# Patient Record
Sex: FEMALE | Race: WHITE | NOT HISPANIC OR LATINO | Employment: FULL TIME | URBAN - METROPOLITAN AREA
[De-identification: names, ages, dates, MRNs, and addresses within clinical notes are randomized per-mention and may not be internally consistent; named-entity substitution may affect disease eponyms.]

---

## 2017-07-17 ENCOUNTER — ALLSCRIPTS OFFICE VISIT (OUTPATIENT)
Dept: OTHER | Facility: OTHER | Age: 42
End: 2017-07-17

## 2017-08-08 ENCOUNTER — LAB REQUISITION (OUTPATIENT)
Dept: LAB | Facility: HOSPITAL | Age: 42
End: 2017-08-08
Payer: COMMERCIAL

## 2017-08-08 ENCOUNTER — ALLSCRIPTS OFFICE VISIT (OUTPATIENT)
Dept: OTHER | Facility: OTHER | Age: 42
End: 2017-08-08

## 2017-08-08 DIAGNOSIS — N39.0 URINARY TRACT INFECTION: ICD-10-CM

## 2017-08-08 DIAGNOSIS — Z12.31 ENCOUNTER FOR SCREENING MAMMOGRAM FOR MALIGNANT NEOPLASM OF BREAST: ICD-10-CM

## 2017-08-08 LAB
BILIRUB UR QL STRIP: NEGATIVE
CLARITY UR: NORMAL
COLOR UR: NORMAL
GLUCOSE (HISTORICAL): NEGATIVE
HGB UR QL STRIP.AUTO: NEGATIVE
KETONES UR STRIP-MCNC: 80 MG/DL
LEUKOCYTE ESTERASE UR QL STRIP: NEGATIVE
NITRITE UR QL STRIP: NEGATIVE
PH UR STRIP.AUTO: 5 [PH]
PROT UR STRIP-MCNC: 15 MG/DL
SP GR UR STRIP.AUTO: 1.03
UROBILINOGEN UR QL STRIP.AUTO: NEGATIVE

## 2017-08-08 PROCEDURE — 87086 URINE CULTURE/COLONY COUNT: CPT | Performed by: NURSE PRACTITIONER

## 2017-08-09 LAB — BACTERIA UR CULT: NORMAL

## 2017-08-10 ENCOUNTER — GENERIC CONVERSION - ENCOUNTER (OUTPATIENT)
Dept: OTHER | Facility: OTHER | Age: 42
End: 2017-08-10

## 2017-09-16 ENCOUNTER — HOSPITAL ENCOUNTER (OUTPATIENT)
Dept: RADIOLOGY | Facility: MEDICAL CENTER | Age: 42
Discharge: HOME | End: 2017-09-16
Payer: COMMERCIAL

## 2017-09-16 DIAGNOSIS — Z12.31 ENCOUNTER FOR SCREENING MAMMOGRAM FOR MALIGNANT NEOPLASM OF BREAST: ICD-10-CM

## 2017-09-16 PROCEDURE — G0202 SCR MAMMO BI INCL CAD: HCPCS

## 2017-09-16 PROCEDURE — 77063 BREAST TOMOSYNTHESIS BI: CPT

## 2018-01-13 NOTE — PROGRESS NOTES
Assessment    1  Herniated lumbar intervertebral disc (722 10) (M51 26)   2  Chronic interstitial cystitis (595 1) (N30 10)   3  Left lumbar radiculopathy (724 4) (M54 16)   4  Chronic lumbar radiculopathy (724 4) (M54 16)    Plan    · Gabapentin 300 MG Oral Capsule; TAKE 2 CAPSULES 3 TIMES DAILY   Rx By: Joseph Poon; Dispense: 30 Days ; #:180 Capsule; Refill: 3; For: Chronic lumbar radiculopathy; DANA = N; Sent To: [id number]   · Follow-up PRN Evaluation and Treatment  Follow-up  Status: Complete  Done:  [id number]   Ordered; For: Chronic lumbar radiculopathy; Ordered By: Joseph Poon Performed:  Due: 33UCF1776    Discussion/Summary    Mrs Curry Katz is improving with conservative medical management  Today we reviewed those options including PT, DANIELLE and medications  I asked her to consider yoga and other core strengthening exercises  I have refilled her script for gabapentin and encouraged her to fu with Dr Thomas Tilley, her PM specialist  I will see her on a PRN basis  Chief Complaint    1  Back Pain  patient returns for scheduled fu  Steady improvement with PT, DANIELLE and medications including gabapentin  Report reduced episodes of left leg pain  back to work FT  Previous left L5/S1 microdisc 12 months ago @ [facility]  History of Present Illness    Kraig Mcdaniel presents with complaints of gradual onset of occasional episodes of mild left > right and left lower back pain, radiating to the left thigh and left foot  On a scale of 1 to 10, the patient rates the pain as 4  Episodes started about 6 years ago  Symptoms are improving  Associated symptoms include no spasm, no stiffness, no fever, no night sweats, no abdominal pain, no general malaise, no weight loss, no arm numbness, no arm weakness, no leg weakness, no urinary incontinence, no fecal incontinence, no urinary retention and no rash localized to the area of pain    leg numbness (intermittent left leg numbness)        Review of Systems    Constitutional: No fever, no chills, feels well, no tiredness, no recent weight gain or weight loss  Eyes: No complaints of eye pain, no red eyes, no eyesight problems, no discharge, no dry eyes, no itching of eyes  ENT: no complaints of earache, no loss of hearing, no nose bleeds, no nasal discharge, no sore throat, no hoarseness  Cardiovascular: No complaints of slow heart rate, no fast heart rate, no chest pain, no palpitations, no leg claudication, no lower extremity edema  Respiratory: No complaints of shortness of breath, no wheezing, no cough, no SOB on exertion, no orthopnea, no PND  Gastrointestinal: No complaints of abdominal pain, no constipation, no nausea or vomiting, no diarrhea, no bloody stools  Genitourinary: No complaints of dysuria, no incontinence, no pelvic pain, no dysmenorrhea, no vaginal discharge or bleeding  Musculoskeletal: arthralgias and limb pain (left leg thigh and foot), but no joint swelling, no myalgias, no joint stiffness and no limb swelling  Integumentary: No complaints of skin rash or lesions, no itching, no skin wounds, no breast pain or lump  Neurological: numbness (intermittent left leg numbness) and tingling (intermittent left leg tingling), but no headache, no confusion, no dizziness, no limb weakness, no convulsions, no fainting and no difficulty walking  Psychiatric: Not suicidal, no sleep disturbance, no anxiety or depression, no change in personality, no emotional problems  Endocrine: No complaints of proptosis, no hot flashes, no muscle weakness, no deepening of the voice, no feelings of weakness  Hematologic/Lymphatic: No complaints of swollen glands, no swollen glands in the neck, does not bleed easily, does not bruise easily  Active Problems    1  Chronic interstitial cystitis (595 1) (N30 10)   2  Herniated lumbar intervertebral disc (722 10) (M51 26)   3  Left lumbar radiculopathy (724 4) (M54 16)   4   Premature ventricular contraction (427 69) (I49 3)   5  Seasonal allergies (477 9) (J30 2)    Surgical History    1  History of Lower Back Surgery   2  History of Surgical Lysis Of Intestinal Adhesions    The surgical history was reviewed and updated today  Family History    1  Family history of cerebrovascular accident (CVA) (V17 1) (Z82 3)    2  Family history of Hypertension (V17 49)    The family history was reviewed and updated today  Social History    · Alcohol   · Completed college, bachelors degree   · Former smoker (Q75 48) (L28 004)   · History of Never A Smoker   · Occupation   · Denied: History of Recreational drug use   · Two children  The social history was reviewed and updated today  Current Meds   1  Bystolic 5 MG Oral Tablet; Take 1 tablet daily; Therapy: 93UVI1839 to (Evaluate:06Era5975)  Requested for: 57YFI4731; Last   Rx:10Mar2016 Ordered   2  CeleBREX 100 MG Oral Capsule; TAKE 1 CAPSULE TWICE DAILY; Therapy: (SQZADUQH:47BDC5542) to Recorded   3  [facility] Vitamin D 2000 UNIT CAPS; Therapy: (Recorded:13Apr2016) to Recorded   4  Gabapentin 300 MG Oral Capsule; TAKE 2 CAPSULES 3 TIMES DAILY; Therapy: 42AHL1455 to (Arthor Given)  Requested for: 85AAE4318 Recorded   5  Hydrocodone-Acetaminophen 7 5-325 MG Oral Tablet; TAKE 1 TABLET 3 TIMES DAILY AS   NEEDED FOR PAIN;   Therapy: (Recorded:07Jan2016) to Recorded   6  Low-Ogestrel 0 3-30 MG-MCG Oral Tablet; Therapy: 77BYE3098 to (Last BP:89STH9272)  Requested for: 77PKY6324 Ordered   7  Magnesium Citrate TABS; Therapy: (Recorded:13Apr2016) to Recorded   8  Riboflavin 400 MG CAPS; Therapy: (Recorded:13Apr2016) to Recorded   9  Vitamin E 400 UNIT Oral Tablet; Therapy: (Recorded:13Apr2016) to Recorded    The medication list was reviewed and updated today  Allergies    1  Clindamycin   2  Gentamicin in [place] SOLN    3  No Known Environmental Allergies   4   No Known Food Allergies    Vitals  Vital Signs [Data Includes: Current Encounter] Recorded: 13Apr2016 09:26AM   Temperature 98 8 F   Heart Rate 122   Respiration 16   Systolic 679   Diastolic 84   Height 5 ft 4 in   Weight 156 lb    BMI Calculated 26 78   BSA Calculated 1 76   Pain Scale 4     Physical Exam   (stable)   Constitutional Patient appears healthy and well developed  No signs of acute distress present  Eyes Vision is grossly normal  Discs flat with sharp margins  Respiratory Auscultation of lungs: Clear to auscultation bilaterally  Cardiovascular Auscultation of heart: Rate is regular  Rhythm is regular  No heart murmur appreciated  Carotid pulses: 2+ and equal bilaterally, without bruits  Peripheral vascular exam: Pedal Pulses: 2+ and equal bilaterally  Radial pulses: 2+ and equal bilaterally  Extremities: No edema of the lower limbs bilaterally  Abdomen The abdomen is flat  No abdominal scars  Abdomen is soft, nontender, and nondistended  Anus, perineum, and rectum: Exam deferred  Neurologic - Mental Status: Alert and Oriented x3  Mood and affect: Affect is normal   Memory is grossly intact  Attention is WNL  Speech is articulated and fluent  Knowledge and vocabulary consistent with education  Cranial Nerve Exam:  2nd cranial nerve: Visual field was full to confrontation  3rd, 4th, and 6th cranial nerves: Normal with no deficit  5th cranial nerve: Sensation was intact in all three divisions to light touch and temperature  Motor function was intact  7th cranial nerve: Face symmetrical at grimace and at rest  8th cranial nerve: Grossly intact to finger rub bilaterally  9th and 10th cranial nerves: Uvula is midline  11th cranial nerve: Shoulder shrug equal bilaterally  12th cranial nerve: Tongue mideline, no atrophy present  Motor System General Motor Strength: No pronator drift and no parietal drift  Motor System - Upper Extremities: Normal to inspection and palpation  Strength: Deltoids 5/5 bilaterally  Biceps 5/5 bilaterally  Triceps 5/5 bilaterally  Extensor carpi radials is 5/5 bilaterally  Extensor digitorum 5/5 bilaterally  Intrinsic 5/5 bilaterally   5/5 bilaterally  Muscle tone: Normal bilaterally  Muscle Bulk: Normal bilaterally  Motor System - Lower Extremities: Normal to inspection and palpation  Strength: Iliopsoas 5/5 bilaterally  Quadriceps 5/5 bilaterally  Hamstrings 5/5 bilaterally  Gastrocnemius 5/5 bilaterally  Muscle tone: Normal bilaterally  Reflexes: Biceps reflexes are 2+ bilaterally  Triceps reflexes are 2+ bilaterally  Achilles reflexes are 2+ bilaterally  Babinski's reflex is 2+ down going bilaterally  Ankle clonus is absent bilaterally  Coordination: Finger to nose was normal    Sensory: Sensation grossly intact to light touch  Sensation grossly intact to light touch  Gait and Station: [place] with a normal gait         Signatures   Electronically signed by : EVIE Saldivar ; Apr 13 2016 10:15AM EST                       (Author)

## 2018-01-14 VITALS
BODY MASS INDEX: 28.68 KG/M2 | HEIGHT: 64 IN | SYSTOLIC BLOOD PRESSURE: 120 MMHG | HEART RATE: 78 BPM | WEIGHT: 168 LBS | DIASTOLIC BLOOD PRESSURE: 78 MMHG

## 2018-01-14 VITALS
BODY MASS INDEX: 27.87 KG/M2 | HEIGHT: 64 IN | WEIGHT: 163.25 LBS | DIASTOLIC BLOOD PRESSURE: 74 MMHG | SYSTOLIC BLOOD PRESSURE: 110 MMHG

## 2018-01-16 NOTE — RESULT NOTES
Verified Results  (1) URINE CULTURE 57Mry5164 10:12AM Jeronimo [facility] Order Number: [id number]     Test Name Result Flag Reference   CLINICAL REPORT (Report)     Test:        Urine culture  Specimen Type:   Urine  Specimen Date:   8/8/2017 10:12 AM  Result Date:    8/9/2017 7:40 PM  Result Status:   Final result  Resulting Lab:   [place] [address]            Tel: [phone]      CULTURE                                       ------------------                                   50,000-59,000 cfu/ml Mixed Contaminants X4

## 2018-02-13 ENCOUNTER — OFFICE VISIT (OUTPATIENT)
Dept: OBGYN CLINIC | Facility: CLINIC | Age: 43
End: 2018-02-13
Payer: COMMERCIAL

## 2018-02-13 VITALS
SYSTOLIC BLOOD PRESSURE: 120 MMHG | WEIGHT: 143.4 LBS | BODY MASS INDEX: 24.48 KG/M2 | HEIGHT: 64 IN | DIASTOLIC BLOOD PRESSURE: 72 MMHG

## 2018-02-13 DIAGNOSIS — Z01.419 ENCOUNTER FOR GYNECOLOGICAL EXAMINATION WITHOUT ABNORMAL FINDING: Primary | ICD-10-CM

## 2018-02-13 DIAGNOSIS — F41.8 SITUATIONAL ANXIETY: ICD-10-CM

## 2018-02-13 DIAGNOSIS — Z80.3 FAMILY HISTORY OF BREAST CANCER: ICD-10-CM

## 2018-02-13 DIAGNOSIS — Z12.31 VISIT FOR SCREENING MAMMOGRAM: ICD-10-CM

## 2018-02-13 DIAGNOSIS — Z30.41 SURVEILLANCE FOR BIRTH CONTROL, ORAL CONTRACEPTIVES: ICD-10-CM

## 2018-02-13 PROBLEM — N94.6 DYSMENORRHEA: Status: ACTIVE | Noted: 2017-08-08

## 2018-02-13 PROCEDURE — S0612 ANNUAL GYNECOLOGICAL EXAMINA: HCPCS | Performed by: NURSE PRACTITIONER

## 2018-02-13 PROCEDURE — G0145 SCR C/V CYTO,THINLAYER,RESCR: HCPCS | Performed by: NURSE PRACTITIONER

## 2018-02-13 PROCEDURE — 87624 HPV HI-RISK TYP POOLED RSLT: CPT | Performed by: NURSE PRACTITIONER

## 2018-02-13 RX ORDER — ALPRAZOLAM 0.25 MG/1
0.25 TABLET ORAL
Qty: 30 TABLET | Refills: 0 | Status: SHIPPED | OUTPATIENT
Start: 2018-02-13 | End: 2019-03-11

## 2018-02-13 NOTE — PROGRESS NOTES
Assessment      Normal well-woman gynecologic exam       Plan     Pap w/ HPV obtained  Mammogram w/ 3D (per pt request-- insurance covers)  [facility]  Situational anxiety- rx called in to her pharmacy for alprazolam 0 25 mg Q hs prn       Subjective      Shital Alexandra is a 36 y o  female who presents for her annual gynecologic exam     Lots of stress  Going through a divorce  Anxiety and trouble sleeping  Her pcp was only willing to order a daily antidepressant  Since situational, she does not want to take a daily medication  Hx of mixed UI-- has seen Urogyn in the past  Stopped OCP in 2017 -- states that she did not stop and would like to continue  Last pap: 2015 pap & hpv negative  Last mammogram: 2016 negative  Sexually active: n/a    Periods are regular every 28-30 days, on OCP, no problems  Current contraception: OCP (estrogen/progesterone)  History of abnormal Pap smear: no  Family history of breast,uterine, ovarian or colon cancer: yes - mother had breast CA      Menstrual History:  OB History      Para Term  AB Living    3 2       2    SAB TAB Ectopic Multiple Live Births                      Menarche age: 14  Patient's last menstrual period was 2018  The following portions of the patient's history were reviewed and updated as appropriate: allergies, current medications, past family history, past medical history, past social history, past surgical history and problem list     Review of Systems      Review of Systems   Constitutional: Negative for chills and fever  Gastrointestinal: Negative for abdominal distention, abdominal pain, blood in stool, constipation, diarrhea, nausea and vomiting  Genitourinary: Negative for difficulty urinating, dysuria, frequency, genital sores, hematuria, menstrual problem, pelvic pain, urgency, vaginal bleeding and vaginal discharge     Psychiatric/Behavioral: Positive for sleep disturbance (situational)  The patient is nervous/anxious (situational  )  Objective      /72   Ht 5' 4" (1 626 m)   Wt 65 kg (143 lb 6 4 oz)   LMP 01/28/2018   BMI 24 61 kg/m²   Physical Exam   Constitutional: She appears well-developed and well-nourished  No distress  Neck: Neck supple  No thyromegaly present  Pulmonary/Chest: Right breast exhibits no inverted nipple, no mass, no nipple discharge, no skin change and no tenderness  Left breast exhibits no inverted nipple, no mass, no nipple discharge, no skin change and no tenderness  Breasts are symmetrical    Abdominal: Soft  Normal appearance  She exhibits no mass  There is no tenderness  There is no CVA tenderness  Genitourinary: Uterus normal  No labial fusion  There is no rash, tenderness, lesion or injury on the right labia  There is no rash, tenderness, lesion or injury on the left labia  Uterus is not enlarged and not tender  Cervix exhibits no motion tenderness, no discharge and no friability  Right adnexum displays no mass, no tenderness and no fullness  Left adnexum displays no mass, no tenderness and no fullness  No erythema, tenderness or bleeding in the vagina  No foreign body in the vagina  No signs of injury around the vagina  No vaginal discharge found  Lymphadenopathy:     She has no cervical adenopathy  She has no axillary adenopathy  Right: No inguinal and no supraclavicular adenopathy present  Left: No inguinal and no supraclavicular adenopathy present  Neurological: She is alert  She is not disoriented  Skin: Skin is warm, dry and intact  Psychiatric: She has a normal mood and affect   Her behavior is normal

## 2018-02-16 LAB
HPV RRNA GENITAL QL NAA+PROBE: NORMAL
LAB AP GYN PRIMARY INTERPRETATION: NORMAL
LAB AP LMP: NORMAL
Lab: NORMAL

## 2018-03-19 ENCOUNTER — OFFICE VISIT (OUTPATIENT)
Dept: OBGYN CLINIC | Facility: CLINIC | Age: 43
End: 2018-03-19
Payer: COMMERCIAL

## 2018-03-19 ENCOUNTER — TELEPHONE (OUTPATIENT)
Dept: OBGYN CLINIC | Facility: CLINIC | Age: 43
End: 2018-03-19

## 2018-03-19 VITALS
DIASTOLIC BLOOD PRESSURE: 64 MMHG | WEIGHT: 148.6 LBS | BODY MASS INDEX: 25.37 KG/M2 | SYSTOLIC BLOOD PRESSURE: 118 MMHG | HEIGHT: 64 IN

## 2018-03-19 DIAGNOSIS — R39.9 LOWER URINARY TRACT SYMPTOMS (LUTS): ICD-10-CM

## 2018-03-19 DIAGNOSIS — Z11.3 SCREENING FOR VENEREAL DISEASE: ICD-10-CM

## 2018-03-19 DIAGNOSIS — R10.2 PELVIC PAIN: Primary | ICD-10-CM

## 2018-03-19 DIAGNOSIS — R39.9 LOWER URINARY TRACT SYMPTOMS (LUTS): Primary | ICD-10-CM

## 2018-03-19 LAB
BACTERIA UR QL AUTO: ABNORMAL /HPF
BILIRUB UR QL STRIP: NEGATIVE
CLARITY UR: ABNORMAL
COLOR UR: ABNORMAL
GLUCOSE UR STRIP-MCNC: NEGATIVE MG/DL
HGB UR QL STRIP.AUTO: ABNORMAL
HYALINE CASTS #/AREA URNS LPF: ABNORMAL /LPF
KETONES UR STRIP-MCNC: NEGATIVE MG/DL
LEUKOCYTE ESTERASE UR QL STRIP: ABNORMAL
NITRITE UR QL STRIP: NEGATIVE
NON-SQ EPI CELLS URNS QL MICRO: ABNORMAL /HPF
PH UR STRIP.AUTO: 5.5 [PH] (ref 4.5–8)
PROT UR STRIP-MCNC: NEGATIVE MG/DL
RBC #/AREA URNS AUTO: ABNORMAL /HPF
SL AMB  POCT GLUCOSE, UA: NEGATIVE
SL AMB LEUKOCYTE ESTERASE,UA: NEGATIVE
SL AMB POCT BILIRUBIN,UA: NEGATIVE
SL AMB POCT BLOOD,UA: 50
SL AMB POCT CLARITY,UA: ABNORMAL
SL AMB POCT COLOR,UA: YELLOW
SL AMB POCT KETONES,UA: NEGATIVE
SL AMB POCT NITRITE,UA: NEGATIVE
SL AMB POCT PH,UA: 5
SL AMB POCT SPECIFIC GRAVITY,UA: 1.01
SL AMB POCT URINE PROTEIN: NEGATIVE
SL AMB POCT UROBILINOGEN: 0.2
SP GR UR STRIP.AUTO: 1.01 (ref 1–1.03)
UROBILINOGEN UR QL STRIP.AUTO: 0.2 E.U./DL
WBC #/AREA URNS AUTO: ABNORMAL /HPF

## 2018-03-19 PROCEDURE — 87480 CANDIDA DNA DIR PROBE: CPT | Performed by: NURSE PRACTITIONER

## 2018-03-19 PROCEDURE — 81001 URINALYSIS AUTO W/SCOPE: CPT | Performed by: NURSE PRACTITIONER

## 2018-03-19 PROCEDURE — 87491 CHLMYD TRACH DNA AMP PROBE: CPT | Performed by: NURSE PRACTITIONER

## 2018-03-19 PROCEDURE — 81002 URINALYSIS NONAUTO W/O SCOPE: CPT | Performed by: NURSE PRACTITIONER

## 2018-03-19 PROCEDURE — 87591 N.GONORRHOEAE DNA AMP PROB: CPT | Performed by: NURSE PRACTITIONER

## 2018-03-19 PROCEDURE — 87510 GARDNER VAG DNA DIR PROBE: CPT | Performed by: NURSE PRACTITIONER

## 2018-03-19 PROCEDURE — 87086 URINE CULTURE/COLONY COUNT: CPT | Performed by: NURSE PRACTITIONER

## 2018-03-19 PROCEDURE — 99213 OFFICE O/P EST LOW 20 MIN: CPT | Performed by: NURSE PRACTITIONER

## 2018-03-19 PROCEDURE — 87660 TRICHOMONAS VAGIN DIR PROBE: CPT | Performed by: NURSE PRACTITIONER

## 2018-03-19 RX ORDER — NITROFURANTOIN 25; 75 MG/1; MG/1
100 CAPSULE ORAL 2 TIMES DAILY
Refills: 0 | COMMUNITY
Start: 2018-03-16 | End: 2018-03-19

## 2018-03-19 RX ORDER — CEPHALEXIN 500 MG/1
500 CAPSULE ORAL EVERY 12 HOURS SCHEDULED
Qty: 14 CAPSULE | Refills: 0 | Status: SHIPPED | OUTPATIENT
Start: 2018-03-19 | End: 2018-03-26

## 2018-03-19 RX ORDER — SULFAMETHOXAZOLE AND TRIMETHOPRIM 800; 160 MG/1; MG/1
1 TABLET ORAL EVERY 12 HOURS SCHEDULED
Qty: 14 TABLET | Refills: 0 | Status: SHIPPED | OUTPATIENT
Start: 2018-03-19 | End: 2018-03-19

## 2018-03-19 NOTE — TELEPHONE ENCOUNTER
Pharmacist from [facility] calling to report that patient is "allergic to Sulfa"  I informed her that the patient's chart only lists clindamycin and gentamycin as allergies  Patient had informed the pharmacist that she "had a bowel obstruction after taking sulfa based medication"  I offered Cipro-- patient informed pharmacist that she "took Cipro once and it gave her bladder problems "    I advised pharmacist that neither of the above reactions were allergy related and that they were an unlikely cause of either problem  Patient still declines either  Will prescribe keflex  If urine sensitivity shows organism to be insensitive to keflex, she will need to try Cipro or levaquin

## 2018-03-19 NOTE — PROGRESS NOTES
Assessment/Plan:    1  Lower urinary tract symptoms (LUTS)  + blood on dip today  Change antibiotic from macrobid to bactrim  Send UA micro w/ culture  Continue with fluids, azo and motrin as needed  - Urinalysis with microscopic; Future  - Urine culture  - POCT urine dip  - sulfamethoxazole-trimethoprim (BACTRIM DS) 800-160 mg per tablet; Take 1 tablet by mouth every 12 (twelve) hours for 7 days  Dispense: 14 tablet; Refill: 0    2  Pelvic pain  As above  Also check culture to rule out infection  Will treat accordingly  - VAGINOSIS DNA PROBE (AFFIRM)    3  Screening for venereal disease    - Chlamydia/GC amplified DNA by PCR; Future      Subjective:      Patient ID: Jean Pierre Malone is a 36 y o  female  HPI  PROBLEM VISIT  CC: urinary symptoms & pelvic pain    Had sex with a new partner approximately 10 days ago (no condom used)  Last Wed, 5 days ago, felt a little nauseated, tired with loss of appetite  On Friday developed painful urinartion, "like razor blades"  She called her PCP who called in a prescription for Macrobid  Symptoms worsened on Saturday-- pain progressed and she vomited  Felt a little feverish, but did not actually check her temperature; no chills  Has been taking Azo, macrobid and motrin since  Felt a little better Saturday night into Sunday, but still having pelvic pain, burning with urination & urgency  BC: Laura-Ogestrel (just ended packet)    The following portions of the patient's history were reviewed and updated as appropriate: allergies, current medications, past family history, past medical history, past social history, past surgical history and problem list     Review of Systems   Gastrointestinal: Positive for nausea (Friday night and Saturday) and vomiting  Negative for abdominal distention, abdominal pain, blood in stool, constipation and diarrhea     Genitourinary: Positive for dysuria, flank pain, pelvic pain, urgency and vaginal discharge (on Friday, but not now; no foul odor or itching)  Negative for frequency, genital sores, hematuria and vaginal bleeding  Odor to urine         Objective:    /64   Ht 5' 4" (1 626 m)   Wt 67 4 kg (148 lb 9 6 oz)   LMP 02/18/2018   BMI 25 51 kg/m²       Physical Exam   Constitutional: She appears well-developed  Genitourinary: There is no rash, tenderness, lesion or injury on the right labia  There is no rash, tenderness, lesion or injury on the left labia  Uterus is tender  Uterus is not enlarged  Cervix exhibits no motion tenderness, no discharge and no friability  Right adnexum displays no mass and no tenderness  Left adnexum displays no mass and no tenderness  No erythema, tenderness or bleeding in the vagina  No foreign body in the vagina  No signs of injury around the vagina  Vaginal discharge (small amount, normal appearing, no odor) found  Genitourinary Comments: Suprapubic tenderness and bladder base tenderness elicited   Lymphadenopathy:        Right: No inguinal adenopathy present  Left: No inguinal adenopathy present

## 2018-03-20 LAB
BACTERIA UR CULT: NORMAL
CANDIDA RRNA VAG QL PROBE: NEGATIVE
G VAGINALIS RRNA GENITAL QL PROBE: NEGATIVE
T VAGINALIS RRNA GENITAL QL PROBE: NEGATIVE

## 2018-03-22 LAB
CHLAMYDIA DNA CVX QL NAA+PROBE: NORMAL
N GONORRHOEA DNA GENITAL QL NAA+PROBE: NORMAL

## 2018-03-26 ENCOUNTER — TELEPHONE (OUTPATIENT)
Dept: OBGYN CLINIC | Facility: CLINIC | Age: 43
End: 2018-03-26

## 2018-03-26 NOTE — TELEPHONE ENCOUNTER
Left message on patient's voice mail regarding her lab results: negative urine culture and negative vaginal/gc & chlamydia cultures

## 2018-04-04 DIAGNOSIS — Z30.41 SURVEILLANCE FOR BIRTH CONTROL, ORAL CONTRACEPTIVES: ICD-10-CM

## 2018-04-04 RX ORDER — NORGESTREL AND ETHINYL ESTRADIOL 0.3-0.03MG
KIT ORAL
Qty: 84 TABLET | Refills: 3 | Status: SHIPPED | OUTPATIENT
Start: 2018-04-04 | End: 2018-06-14 | Stop reason: SDUPTHER

## 2018-04-23 ENCOUNTER — OFFICE VISIT (OUTPATIENT)
Dept: CARDIOLOGY CLINIC | Facility: MEDICAL CENTER | Age: 43
End: 2018-04-23
Payer: COMMERCIAL

## 2018-04-23 VITALS
BODY MASS INDEX: 24.28 KG/M2 | HEIGHT: 64 IN | OXYGEN SATURATION: 98 % | SYSTOLIC BLOOD PRESSURE: 122 MMHG | DIASTOLIC BLOOD PRESSURE: 64 MMHG | HEART RATE: 93 BPM | WEIGHT: 142.2 LBS

## 2018-04-23 DIAGNOSIS — I10 HYPERTENSION, UNSPECIFIED TYPE: Primary | ICD-10-CM

## 2018-04-23 DIAGNOSIS — R00.2 PALPITATIONS: ICD-10-CM

## 2018-04-23 DIAGNOSIS — I10 ESSENTIAL HYPERTENSION: ICD-10-CM

## 2018-04-23 PROCEDURE — 93000 ELECTROCARDIOGRAM COMPLETE: CPT | Performed by: INTERNAL MEDICINE

## 2018-04-23 PROCEDURE — 99214 OFFICE O/P EST MOD 30 MIN: CPT | Performed by: INTERNAL MEDICINE

## 2018-04-23 RX ORDER — NEBIVOLOL 5 MG/1
5 TABLET ORAL DAILY
Qty: 90 TABLET | Refills: 3 | Status: SHIPPED | OUTPATIENT
Start: 2018-04-23 | End: 2019-03-11

## 2018-04-23 RX ORDER — NEBIVOLOL 5 MG/1
5 TABLET ORAL AS NEEDED
COMMUNITY
End: 2018-04-23 | Stop reason: SDUPTHER

## 2018-04-23 NOTE — PROGRESS NOTES
Cardiology   Meryl Elder 36 y o  female MRN: [id number]        Impression:  1  Palpitations - likely premature ventricular contractions  2  Hypertension     Recommendations:  1  Continue Bystolic on an daily basis  2  Follow up in 12 months  HPI: Meryl Elder is a 36y o  year old female with history of premature ventricular contractions who presents for follow up  Continues to have palpitations, but takes Bystolic not on an everyday basis  Review of Systems   Constitutional: Negative  HENT: Negative  Eyes: Negative  Respiratory: Negative for chest tightness and shortness of breath  Cardiovascular: Positive for palpitations  Negative for chest pain and leg swelling  Gastrointestinal: Negative  Endocrine: Negative  Genitourinary: Negative  Musculoskeletal: Negative  Skin: Negative  Allergic/Immunologic: Negative  Neurological: Negative  Hematological: Negative  Psychiatric/Behavioral: Negative  All other systems reviewed and are negative  Past Medical History:   Diagnosis Date    Atrial flutter ([address] )     Herniated lumbar intervertebral disc     Hypertension     Intestinal obstruction [facility] [address]     Scoliosis      Past Surgical History:   Procedure Laterality Date    BACK SURGERY  02/2015    Lower back  L5-S1 microdiscectomy     CY[facility]   OTHER SURGICAL HISTORY      Surgical lysis of intestinal adhesions    VOLVULUS REDUCTION       History   Alcohol Use    Yes     History   Drug Use No     History   Smoking Status    Former Smoker   Smokeless Tobacco    Never Used     Family History   Problem Relation Age of Onset    Other Mother      Malignant neoplasm of breast    Hypertension Father     Stroke Maternal Grandmother     Hypertension Family        Allergies:   Allergies   Allergen Reactions    Bactrim [Sulfamethoxazole-Trimethoprim] Other (See Comments)     "Resulted in bowel obstruction"    Ciprofloxacin Other (See Comments)     "Resulted in bladder problems"    Clindamycin     Gentamicin      Other reaction(s): Other (See Comments)  Unknown Reaction from childhood       Medications:     Current Outpatient Prescriptions:     ALPRAZolam (XANAX) 0 25 mg tablet, Take 1 tablet (0 25 mg total) by mouth daily at bedtime as needed for anxiety, Disp: 30 tablet, Rfl: 0    LOW-OGESTREL 0 3-30 MG-MCG per tablet, TAKE ONE TABLET BY MOUTH ONCE DAILY , Disp: 84 tablet, Rfl: 3    nebivolol (BYSTOLIC) 5 mg tablet, Take 5 mg by mouth as needed , Disp: , Rfl:       Wt Readings from Last 3 Encounters:   04/23/18 64 5 kg (142 lb 3 2 oz)   03/19/18 67 4 kg (148 lb 9 6 oz)   02/13/18 65 kg (143 lb 6 4 oz)     Temp Readings from Last 3 Encounters:   04/13/16 98 8 °F (37 1 °C)   01/07/16 98 5 °F (36 9 °C)     BP Readings from Last 3 Encounters:   04/23/18 122/64   03/19/18 118/64   02/13/18 120/72     Pulse Readings from Last 3 Encounters:   04/23/18 93   07/17/17 78   12/19/16 (!) 109         Physical Exam   Constitutional: She is oriented to person, place, and time  She appears well-developed  HENT:   Head: Normocephalic  Eyes: EOM are normal    Neck: Normal range of motion  Cardiovascular: Normal rate, regular rhythm and normal heart sounds  Exam reveals no gallop and no friction rub  No murmur heard  Pulmonary/Chest: Effort normal and breath sounds normal  No respiratory distress  She has no wheezes  She has no rales  Abdominal: Soft  Musculoskeletal: Normal range of motion  Neurological: She is alert and oriented to person, place, and time  Skin: Skin is warm and dry  Psychiatric: She has a normal mood and affect           Laboratory Studies:  CMP:  Lab Results   Component Value Date     03/02/2016    K 3 8 03/02/2016     03/02/2016    CO2 27 03/02/2016    ANIONGAP 5 03/02/2016    BUN 7 03/02/2016    CREATININE 0 80 03/02/2016    GLUCOSE 103 03/02/2016    AST 36 03/02/2016    ALT 44 03/02/2016    BILITOT 0 34 03/02/2016    EGFR >60 0 03/02/2016       Cardiac testing:   EKG reviewed personally: LYUDMILA Pate

## 2018-06-01 ENCOUNTER — HOSPITAL ENCOUNTER (OUTPATIENT)
Dept: RADIOLOGY | Facility: MEDICAL CENTER | Age: 43
Discharge: HOME | End: 2018-06-01
Payer: COMMERCIAL

## 2018-06-01 DIAGNOSIS — Z12.31 VISIT FOR SCREENING MAMMOGRAM: ICD-10-CM

## 2018-06-01 DIAGNOSIS — Z80.3 FAMILY HISTORY OF BREAST CANCER: ICD-10-CM

## 2018-06-01 PROCEDURE — 77067 SCR MAMMO BI INCL CAD: CPT

## 2018-06-01 PROCEDURE — 77063 BREAST TOMOSYNTHESIS BI: CPT

## 2018-06-14 DIAGNOSIS — Z30.41 SURVEILLANCE FOR BIRTH CONTROL, ORAL CONTRACEPTIVES: ICD-10-CM

## 2018-06-27 DIAGNOSIS — Z30.41 SURVEILLANCE FOR BIRTH CONTROL, ORAL CONTRACEPTIVES: ICD-10-CM

## 2018-06-28 RX ORDER — NORGESTREL AND ETHINYL ESTRADIOL 0.3-0.03MG
KIT ORAL
Qty: 28 TABLET | Refills: 0 | OUTPATIENT
Start: 2018-06-28

## 2018-07-11 ENCOUNTER — TELEPHONE (OUTPATIENT)
Dept: CARDIOLOGY CLINIC | Facility: CLINIC | Age: 43
End: 2018-07-11

## 2018-07-11 NOTE — TELEPHONE ENCOUNTER
Jannet Tenorio called, c/o centralized chest heaviness on/off last week for a couple days  Nothing brought on pain  Denied any SOB w/ episodes  Over the weekend Pt had shooting pain, numbness and tingling starting in her neck radiating down her left arm into finger tips  Ring finger became edematous and ecchymotic  Today, finger is still ecchymotic  She saw her PCP and was ordered carotid dopplers  Office did not check her bp  Pt does not check her bp's at home either but states they have been WNL  She wants to know if you want to see her or have any other testing? Please advise     C/b # [phone]

## 2018-09-19 ENCOUNTER — OFFICE VISIT (OUTPATIENT)
Dept: OBGYN CLINIC | Facility: CLINIC | Age: 43
End: 2018-09-19
Payer: COMMERCIAL

## 2018-09-19 VITALS — SYSTOLIC BLOOD PRESSURE: 118 MMHG | DIASTOLIC BLOOD PRESSURE: 64 MMHG | BODY MASS INDEX: 25.01 KG/M2 | WEIGHT: 145.8 LBS

## 2018-09-19 DIAGNOSIS — N76.0 VULVOVAGINITIS: Primary | ICD-10-CM

## 2018-09-19 DIAGNOSIS — R30.0 BURNING WITH URINATION: ICD-10-CM

## 2018-09-19 DIAGNOSIS — Z30.41 SURVEILLANCE FOR BIRTH CONTROL, ORAL CONTRACEPTIVES: ICD-10-CM

## 2018-09-19 LAB
SL AMB  POCT GLUCOSE, UA: ABNORMAL
SL AMB LEUKOCYTE ESTERASE,UA: ABNORMAL
SL AMB POCT BILIRUBIN,UA: ABNORMAL
SL AMB POCT BLOOD,UA: ABNORMAL
SL AMB POCT CLARITY,UA: ABNORMAL
SL AMB POCT COLOR,UA: YELLOW
SL AMB POCT KETONES,UA: ABNORMAL
SL AMB POCT NITRITE,UA: ABNORMAL
SL AMB POCT PH,UA: 7
SL AMB POCT SPECIFIC GRAVITY,UA: 1.01
SL AMB POCT URINE PROTEIN: ABNORMAL
SL AMB POCT UROBILINOGEN: 0.2

## 2018-09-19 PROCEDURE — 87480 CANDIDA DNA DIR PROBE: CPT | Performed by: NURSE PRACTITIONER

## 2018-09-19 PROCEDURE — 87510 GARDNER VAG DNA DIR PROBE: CPT | Performed by: NURSE PRACTITIONER

## 2018-09-19 PROCEDURE — 87660 TRICHOMONAS VAGIN DIR PROBE: CPT | Performed by: NURSE PRACTITIONER

## 2018-09-19 PROCEDURE — 99213 OFFICE O/P EST LOW 20 MIN: CPT | Performed by: NURSE PRACTITIONER

## 2018-09-19 PROCEDURE — 81002 URINALYSIS NONAUTO W/O SCOPE: CPT | Performed by: NURSE PRACTITIONER

## 2018-09-19 RX ORDER — FLUCONAZOLE 150 MG/1
150 TABLET ORAL ONCE
Qty: 2 TABLET | Refills: 0 | Status: SHIPPED | OUTPATIENT
Start: 2018-09-19 | End: 2018-09-19

## 2018-09-19 NOTE — PROGRESS NOTES
Assessment/Plan:    1  Vulvovaginitis  RX sent for symptoms consistent with vaginal candida  Affirm taken  Will treat further as indicated    - fluconazole (DIFLUCAN) 150 mg tablet; Take 1 tablet (150 mg total) by mouth once for 1 dose Re[eat 2nd dose in 3 days if needed  Dispense: 2 tablet; Refill: 0  - triamcinolone (KENALOG) 0 1 % ointment; Apply topically 2 (two) times a day  Dispense: 30 g; Refill: 0    2  Burning with urination    - POCT urine dip: negative    Subjective:      Patient ID: Heather Dominguez is a 36 y o  female  HPI  PROBLEM VISIT  CC: vulvovaginal symptoms    Symptoms of itching and burningstarted Sunday, 4 days ago, and have gotten progressively worse  Used monistat 1 yesterday  Theresa like she was coming down with something at that time as well  Also having increased vag dc, no odor  No abn bleeding or pelvic pain  Burns when urine hits outside skin, but dysuria/ freq or urgency  No recent ab treatment  Same partner, 8 months  No condoms  The following portions of the patient's history were reviewed and updated as appropriate: allergies, current medications, past family history, past medical history, past social history, past surgical history and problem list     Review of Systems   Constitutional: Negative for chills and fever  Genitourinary: Positive for vaginal discharge (with itching and burning)  Negative for dyspareunia, dysuria, frequency, genital sores, hematuria, menstrual problem, pelvic pain, urgency, vaginal bleeding and vaginal pain  Recent antibiotic treatment- no         Objective:    /64   Wt 66 1 kg (145 lb 12 8 oz)   LMP 09/05/2018   BMI 25 01 kg/m²      Physical Exam   Constitutional: She appears well-developed  Genitourinary:       There is no rash, tenderness, lesion or injury on the right labia  There is no rash, tenderness, lesion or injury on the left labia  Uterus is not enlarged and not tender   Cervix exhibits no motion tenderness, no discharge and no friability  Right adnexum displays no mass and no tenderness  Left adnexum displays no mass and no tenderness  No erythema, tenderness or bleeding in the vagina  No foreign body in the vagina  No signs of injury around the vagina  Vaginal discharge (curdy) found  Lymphadenopathy:        Right: No inguinal adenopathy present  Left: No inguinal adenopathy present

## 2018-09-20 LAB
CANDIDA RRNA VAG QL PROBE: POSITIVE
G VAGINALIS RRNA GENITAL QL PROBE: NEGATIVE
T VAGINALIS RRNA GENITAL QL PROBE: NEGATIVE

## 2018-09-21 ENCOUNTER — TELEPHONE (OUTPATIENT)
Dept: OBGYN CLINIC | Facility: CLINIC | Age: 43
End: 2018-09-21

## 2018-09-21 NOTE — TELEPHONE ENCOUNTER
Patient is still having yeast infection symptoms  Culture was positive for Candida  She took one dose of Diflucan on 9/19  Instructed patient to take second dose today  Call on Monday 9/24 if symptoms have not resolved

## 2018-09-21 NOTE — TELEPHONE ENCOUNTER
Pt LMOM said she is not feeling better and has 1 oral pill left  Still has burning and itching  Cesilia Jennings

## 2018-10-10 ENCOUNTER — TELEPHONE (OUTPATIENT)
Dept: OBGYN CLINIC | Facility: CLINIC | Age: 43
End: 2018-10-10

## 2018-10-10 DIAGNOSIS — N76.0 RECURRENT VAGINITIS: Primary | ICD-10-CM

## 2018-10-10 RX ORDER — FLUCONAZOLE 150 MG/1
TABLET ORAL
Qty: 7 TABLET | Refills: 0 | Status: SHIPPED | OUTPATIENT
Start: 2018-10-10 | End: 2019-03-11

## 2018-10-10 NOTE — TELEPHONE ENCOUNTER
Spoke with patient  Has been suffering with recurrent vaginitis  Last treated 3 wks ago with fluconazole, took two doses  Had about 2 days with no symptoms, then got period which ended Monday  Now with symptoms again: itching, discharge  Has been abstinent for 2 weeks  Up to date on well checks with PCP and normal fasting glucose  Will treat with a 7 day course of terazol, then monthly fluconazole x 6  Will resume probiotics  Follow up prn

## 2018-10-12 LAB — HBA1C MFR BLD HPLC: 5.2 %

## 2019-01-27 DIAGNOSIS — Z30.41 SURVEILLANCE FOR BIRTH CONTROL, ORAL CONTRACEPTIVES: ICD-10-CM

## 2019-02-28 ENCOUNTER — TELEPHONE (OUTPATIENT)
Dept: OBGYN CLINIC | Facility: CLINIC | Age: 44
End: 2019-02-28

## 2019-02-28 DIAGNOSIS — Z30.41 SURVEILLANCE FOR BIRTH CONTROL, ORAL CONTRACEPTIVES: ICD-10-CM

## 2019-02-28 RX ORDER — NORGESTREL AND ETHINYL ESTRADIOL 0.3-0.03MG
1 KIT ORAL DAILY
Qty: 28 TABLET | Refills: 1 | Status: SHIPPED | OUTPATIENT
Start: 2019-02-28 | End: 2019-03-11 | Stop reason: SDUPTHER

## 2019-02-28 NOTE — TELEPHONE ENCOUNTER
Pt called requesting a one month supply of her birth control Low-Ogestrel 0 3mg be sent to [address] [address] on file in her chart  Pt states she can no longer use the [facility] since her divorce has been finalized and now needs to use local pharmacy  Pt states her birth control needs to say Brand Specific, her annual exam is scheduled with Hood Flores for 3/11/19

## 2019-03-11 ENCOUNTER — ANNUAL EXAM (OUTPATIENT)
Dept: OBGYN CLINIC | Facility: CLINIC | Age: 44
End: 2019-03-11
Payer: COMMERCIAL

## 2019-03-11 VITALS
WEIGHT: 146 LBS | SYSTOLIC BLOOD PRESSURE: 134 MMHG | HEIGHT: 64 IN | BODY MASS INDEX: 24.92 KG/M2 | DIASTOLIC BLOOD PRESSURE: 74 MMHG

## 2019-03-11 DIAGNOSIS — Z30.41 SURVEILLANCE FOR BIRTH CONTROL, ORAL CONTRACEPTIVES: ICD-10-CM

## 2019-03-11 DIAGNOSIS — N92.0 MENORRHAGIA WITH REGULAR CYCLE: ICD-10-CM

## 2019-03-11 DIAGNOSIS — Z01.411 ENCOUNTER FOR GYNECOLOGICAL EXAMINATION WITH ABNORMAL FINDING: Primary | ICD-10-CM

## 2019-03-11 DIAGNOSIS — Z12.31 ENCOUNTER FOR SCREENING MAMMOGRAM FOR BREAST CANCER: ICD-10-CM

## 2019-03-11 DIAGNOSIS — N94.6 DYSMENORRHEA: ICD-10-CM

## 2019-03-11 PROCEDURE — S0612 ANNUAL GYNECOLOGICAL EXAMINA: HCPCS | Performed by: NURSE PRACTITIONER

## 2019-03-11 RX ORDER — DULOXETIN HYDROCHLORIDE 60 MG/1
60 CAPSULE, DELAYED RELEASE ORAL
COMMUNITY
End: 2019-03-11

## 2019-03-11 RX ORDER — NORGESTREL AND ETHINYL ESTRADIOL 0.3-0.03MG
1 KIT ORAL DAILY
Qty: 84 TABLET | Refills: 0 | Status: SHIPPED | OUTPATIENT
Start: 2019-03-11 | End: 2019-03-18 | Stop reason: SDUPTHER

## 2019-03-11 NOTE — PROGRESS NOTES
Assessment / Plan    1  Encounter for gynecological examination with abnormal finding  Normal well woman exam   pap/hpv negative, plan repeat     2  Encounter for screening mammogram for breast cancer  Order provided    - Mammo screening bilateral w 3d & cad; Future    3  Menorrhagia with regular cycle  Significant change while on OCP  Check pelvic US, TSH and CBC  Interested in LN- IUD  Discussed and insurance confirmation request sent to Nabeel Francis  - US pelvis complete non OB; Future  - TSH, 3rd generation with Free T4 reflex; Future  - CBC and differential; Future  - TSH, 3rd generation with Free T4 reflex  - CBC and differential    4  Dysmenorrhea  As above    - US pelvis complete non OB; Future    5  Surveillance for birth control, oral contraceptives  Continue for now  After above evaluation will likely plan LN-IUD     - LOW-OGESTREL 0 3-30 MG-MCG per tablet; Take 1 tablet by mouth daily  Dispense: 84 tablet; Refill: 0      Subjective      Jessica Oliveros is a 39 y o  female who presents for her annual gynecologic exam     Last pap: 2018 p/h negative  Last mammogram: 2018 3D negative, 3/  Sexually active: yes    Has been on same OCP for years  Has noticed significant change in menstrual flow, lasts 3-4 days, very heavy and painful  Periods are regular every 28  Current contraception: OCP (estrogen/progesterone)  History of abnormal Pap smear: no  Family history of breast,uterine, ovarian or colon cancer: yes - mother w/ breast ca at age 71      Menstrual History:  OB History        3    Para   2    Term                AB        Living   2       SAB        TAB        Ectopic        Multiple        Live Births                    Menarche age: 14  Patient's last menstrual period was 2019 (approximate)         The following portions of the patient's history were reviewed and updated as appropriate: allergies, current medications, past family history, past medical history, past social history, past surgical history and problem list     Review of Systems      Review of Systems   Constitutional: Negative for chills and fever  Gastrointestinal: Negative for abdominal distention, abdominal pain, blood in stool, constipation, diarrhea, nausea and vomiting  Genitourinary: Positive for menstrual problem (heavy flow, painful cramps)  Negative for difficulty urinating, dysuria, frequency, genital sores, hematuria, pelvic pain, urgency, vaginal bleeding and vaginal discharge  Breasts:  Negative for skin changes, dimpling, asymmetry, nipple discharge, redness, tenderness or palpable masses    Objective      Ht 5' 4" (1 626 m)   Wt 66 2 kg (146 lb)   LMP 02/20/2019 (Approximate)   BMI 25 06 kg/m²      Physical Exam   Constitutional: She appears well-developed and well-nourished  No distress  HENT:   Head: Normocephalic and atraumatic  Eyes: Pupils are equal, round, and reactive to light  Neck: Neck supple  No thyromegaly present  Pulmonary/Chest: Effort normal  Right breast exhibits no inverted nipple, no mass, no nipple discharge, no skin change and no tenderness  Left breast exhibits no inverted nipple, no mass, no nipple discharge, no skin change and no tenderness  Breasts are symmetrical    Abdominal: Soft  Normal appearance  She exhibits no mass  There is no tenderness  There is no CVA tenderness  Genitourinary: Rectum normal and uterus normal  Rectal exam shows guaiac negative stool  Pelvic exam was performed with patient supine  No labial fusion  There is no rash, tenderness, lesion or injury on the right labia  There is no rash, tenderness, lesion or injury on the left labia  Uterus is not enlarged and not tender  Cervix exhibits no motion tenderness, no discharge and no friability  Right adnexum displays no mass, no tenderness and no fullness  Left adnexum displays no mass, no tenderness and no fullness  No erythema, tenderness or bleeding in the vagina   No foreign body in the vagina  No signs of injury around the vagina  No vaginal discharge found  Lymphadenopathy:     She has no cervical adenopathy  She has no axillary adenopathy  Right: No inguinal and no supraclavicular adenopathy present  Left: No inguinal and no supraclavicular adenopathy present  Neurological: She is alert  She is not disoriented  Skin: Skin is warm, dry and intact  Psychiatric: She has a normal mood and affect   Her behavior is normal  Thought content normal

## 2019-03-12 ENCOUNTER — TELEPHONE (OUTPATIENT)
Dept: OBGYN CLINIC | Facility: CLINIC | Age: 44
End: 2019-03-12

## 2019-03-12 LAB
BASOPHILS # BLD AUTO: 61 CELLS/UL (ref 0–200)
BASOPHILS NFR BLD AUTO: 1.2 %
EOSINOPHIL # BLD AUTO: 41 CELLS/UL (ref 15–500)
EOSINOPHIL NFR BLD AUTO: 0.8 %
ERYTHROCYTE [DISTWIDTH] IN BLOOD BY AUTOMATED COUNT: 12.4 % (ref 11–15)
HCT VFR BLD AUTO: 43.9 % (ref 35–45)
HGB BLD-MCNC: 15 G/DL (ref 11.7–15.5)
LYMPHOCYTES # BLD AUTO: 1499 CELLS/UL (ref 850–3900)
LYMPHOCYTES NFR BLD AUTO: 29.4 %
MCH RBC QN AUTO: 31.5 PG (ref 27–33)
MCHC RBC AUTO-ENTMCNC: 34.2 G/DL (ref 32–36)
MCV RBC AUTO: 92.2 FL (ref 80–100)
MONOCYTES # BLD AUTO: 321 CELLS/UL (ref 200–950)
MONOCYTES NFR BLD AUTO: 6.3 %
NEUTROPHILS # BLD AUTO: 3177 CELLS/UL (ref 1500–7800)
NEUTROPHILS NFR BLD AUTO: 62.3 %
PLATELET # BLD AUTO: 320 THOUSAND/UL (ref 140–400)
PMV BLD REES-ECKER: 10.6 FL (ref 7.5–12.5)
RBC # BLD AUTO: 4.76 MILLION/UL (ref 3.8–5.1)
TSH SERPL-ACNC: 1.26 MIU/L
WBC # BLD AUTO: 5.1 THOUSAND/UL (ref 3.8–10.8)

## 2019-03-13 ENCOUNTER — HOSPITAL ENCOUNTER (OUTPATIENT)
Dept: RADIOLOGY | Facility: MEDICAL CENTER | Age: 44
Discharge: HOME | End: 2019-03-13
Payer: COMMERCIAL

## 2019-03-13 DIAGNOSIS — N92.0 MENORRHAGIA WITH REGULAR CYCLE: ICD-10-CM

## 2019-03-13 DIAGNOSIS — N94.6 DYSMENORRHEA: ICD-10-CM

## 2019-03-13 PROCEDURE — 76830 TRANSVAGINAL US NON-OB: CPT

## 2019-03-13 PROCEDURE — 76856 US EXAM PELVIC COMPLETE: CPT

## 2019-03-18 ENCOUNTER — TELEPHONE (OUTPATIENT)
Dept: OBGYN CLINIC | Facility: CLINIC | Age: 44
End: 2019-03-18

## 2019-03-18 DIAGNOSIS — Z30.41 SURVEILLANCE FOR BIRTH CONTROL, ORAL CONTRACEPTIVES: ICD-10-CM

## 2019-03-18 RX ORDER — NORGESTREL AND ETHINYL ESTRADIOL 0.3-0.03MG
1 KIT ORAL DAILY
Qty: 84 TABLET | Refills: 4 | Status: SHIPPED | OUTPATIENT
Start: 2019-03-18 | End: 2019-08-28

## 2019-03-18 NOTE — TELEPHONE ENCOUNTER
Spoke to patient  Informed of pelvic US findings which were normal except for a small 1 cm uterine fibroid which is inconsequential   She has decided to continue with Low-Ogestrel OCP for now-- if she changes her mind and wants to have LN IUD she will let me know

## 2019-03-22 DIAGNOSIS — I10 ESSENTIAL HYPERTENSION: Primary | ICD-10-CM

## 2019-03-22 RX ORDER — NEBIVOLOL 5 MG/1
5 TABLET ORAL DAILY
Qty: 30 TABLET | Refills: 6 | Status: SHIPPED | OUTPATIENT
Start: 2019-03-22 | End: 2019-04-04

## 2019-03-22 RX ORDER — NEBIVOLOL 5 MG/1
5 TABLET ORAL DAILY
Qty: 90 TABLET | Refills: 3 | Status: SHIPPED | OUTPATIENT
Start: 2019-03-22 | End: 2019-04-04

## 2019-03-27 ENCOUNTER — TELEPHONE (OUTPATIENT)
Dept: CARDIOLOGY CLINIC | Facility: CLINIC | Age: 44
End: 2019-03-27

## 2019-04-04 DIAGNOSIS — I10 ESSENTIAL HYPERTENSION: ICD-10-CM

## 2019-04-04 RX ORDER — NEBIVOLOL 5 MG/1
5 TABLET ORAL DAILY
Qty: 90 TABLET | Refills: 1 | OUTPATIENT
Start: 2019-04-04 | End: 2019-11-21 | Stop reason: ALTCHOICE

## 2019-04-22 ENCOUNTER — DOCUMENTATION (OUTPATIENT)
Dept: OBGYN CLINIC | Facility: CLINIC | Age: 44
End: 2019-04-22

## 2019-08-28 ENCOUNTER — TELEPHONE (OUTPATIENT)
Dept: OBGYN CLINIC | Facility: CLINIC | Age: 44
End: 2019-08-28

## 2019-08-28 DIAGNOSIS — Z30.41 SURVEILLANCE FOR BIRTH CONTROL, ORAL CONTRACEPTIVES: Primary | ICD-10-CM

## 2019-08-28 DIAGNOSIS — Z30.41 SURVEILLANCE FOR BIRTH CONTROL, ORAL CONTRACEPTIVES: ICD-10-CM

## 2019-08-28 RX ORDER — NORETHINDRONE ACETATE AND ETHINYL ESTRADIOL AND FERROUS FUMARATE 1MG-20(24)
1 KIT ORAL DAILY
Qty: 84 TABLET | Refills: 2 | Status: SHIPPED | OUTPATIENT
Start: 2019-08-28 | End: 2019-11-19

## 2019-08-28 RX ORDER — NORETHINDRONE ACETATE AND ETHINYL ESTRADIOL AND FERROUS FUMARATE 1MG-20(24)
1 KIT ORAL DAILY
Qty: 84 TABLET | Refills: 2 | Status: SHIPPED | OUTPATIENT
Start: 2019-08-28 | End: 2019-08-28 | Stop reason: SDUPTHER

## 2019-08-28 NOTE — TELEPHONE ENCOUNTER
Phone call from patient reporting irregular bleeding  States that when she has sex she notices some bleeding ann marie-menstrually, ie, a little before withdrawal flow and a little after withdrawal flow  This has been going on and is less of a concern to her than recent episode of bright red bleeding which occurred after having sex this past weekend  She is on Low-Ogestrel OCP and reports bleeding occurred while taking hormonal pills, during third week of packet  Denies missing any pills or taking any late  3/2019 pelvic US was done due to heavier periods, even while on OCP  Findings revealed one small uterine fibroid of 1 1 cm and eml of 2 mm  TSH & CBC were normal     Will try changing OCP formulation since she has been on Low ogestrel for many years  If continues to have BTB after 3 months on new pill, will need to be seen in office for evaluation and probable endometrial biopsy

## 2019-11-14 ENCOUNTER — HOSPITAL ENCOUNTER (OUTPATIENT)
Dept: RADIOLOGY | Facility: MEDICAL CENTER | Age: 44
Discharge: HOME | End: 2019-11-14
Payer: COMMERCIAL

## 2019-11-14 VITALS — BODY MASS INDEX: 24.92 KG/M2 | HEIGHT: 64 IN | WEIGHT: 146 LBS

## 2019-11-14 DIAGNOSIS — Z12.31 ENCOUNTER FOR SCREENING MAMMOGRAM FOR BREAST CANCER: ICD-10-CM

## 2019-11-14 PROCEDURE — 77067 SCR MAMMO BI INCL CAD: CPT

## 2019-11-14 PROCEDURE — 77063 BREAST TOMOSYNTHESIS BI: CPT

## 2019-11-15 ENCOUNTER — PATIENT MESSAGE (OUTPATIENT)
Dept: OBGYN CLINIC | Facility: CLINIC | Age: 44
End: 2019-11-15

## 2019-11-15 DIAGNOSIS — N92.0 MENORRHAGIA WITH REGULAR CYCLE: Primary | ICD-10-CM

## 2019-11-19 RX ORDER — NORGESTIMATE AND ETHINYL ESTRADIOL 0.25-0.035
1 KIT ORAL DAILY
Qty: 28 TABLET | Refills: 5 | Status: SHIPPED | OUTPATIENT
Start: 2019-11-19 | End: 2019-11-21 | Stop reason: SDUPTHER

## 2019-11-21 ENCOUNTER — HOSPITAL ENCOUNTER (OUTPATIENT)
Dept: MAMMOGRAPHY | Facility: CLINIC | Age: 44
Discharge: HOME | End: 2019-11-21
Payer: COMMERCIAL

## 2019-11-21 ENCOUNTER — HOSPITAL ENCOUNTER (OUTPATIENT)
Dept: ULTRASOUND IMAGING | Facility: CLINIC | Age: 44
Discharge: HOME | End: 2019-11-21
Payer: COMMERCIAL

## 2019-11-21 DIAGNOSIS — R92.8 ABNORMAL MAMMOGRAM: ICD-10-CM

## 2019-11-21 PROCEDURE — 76642 ULTRASOUND BREAST LIMITED: CPT

## 2019-11-21 PROCEDURE — G0279 TOMOSYNTHESIS, MAMMO: HCPCS

## 2019-11-21 PROCEDURE — 77065 DX MAMMO INCL CAD UNI: CPT

## 2019-11-21 RX ORDER — NORETHINDRONE ACETATE AND ETHINYL ESTRADIOL 1MG-20(24)
1 KIT ORAL DAILY
COMMUNITY
Start: 2019-11-19 | End: 2019-11-25

## 2019-11-21 NOTE — PROGRESS NOTES
Met with patient and Dr Christian Brady regarding recommendation for;    ___X__ RIGHT ______LEFT      __X___ Breast Cyst Aspiration  __X___Verbalized understanding        Blood thinners:  _____yes __X___no    Date stopped: ___N/A____    Biopsy teaching sheet given:  __X___yes ____no    Pt given contact information and adv to call with any questions/needs

## 2019-11-25 DIAGNOSIS — N92.0 MENORRHAGIA WITH REGULAR CYCLE: Primary | ICD-10-CM

## 2019-11-25 RX ORDER — NORGESTIMATE AND ETHINYL ESTRADIOL 0.25-0.035
1 KIT ORAL DAILY
Qty: 84 TABLET | Refills: 1 | Status: SHIPPED | OUTPATIENT
Start: 2019-11-25 | End: 2020-03-12 | Stop reason: SDUPTHER

## 2019-12-03 ENCOUNTER — HOSPITAL ENCOUNTER (OUTPATIENT)
Dept: MAMMOGRAPHY | Facility: CLINIC | Age: 44
Discharge: HOME | End: 2019-12-03

## 2019-12-03 ENCOUNTER — TRANSCRIBE ORDERS (OUTPATIENT)
Dept: MAMMOGRAPHY | Facility: CLINIC | Age: 44
End: 2019-12-03

## 2019-12-03 ENCOUNTER — HOSPITAL ENCOUNTER (OUTPATIENT)
Dept: ULTRASOUND IMAGING | Facility: CLINIC | Age: 44
Discharge: HOME | End: 2019-12-03
Admitting: RADIOLOGY
Payer: COMMERCIAL

## 2019-12-03 VITALS — DIASTOLIC BLOOD PRESSURE: 96 MMHG | HEART RATE: 110 BPM | SYSTOLIC BLOOD PRESSURE: 155 MMHG

## 2019-12-03 DIAGNOSIS — R92.8 ABNORMAL MAMMOGRAM: ICD-10-CM

## 2019-12-03 DIAGNOSIS — R92.8 ABNORMAL MAMMOGRAM: Primary | ICD-10-CM

## 2019-12-03 PROCEDURE — 19000 PUNCTURE ASPIR CYST BREAST: CPT

## 2019-12-03 PROCEDURE — 76942 ECHO GUIDE FOR BIOPSY: CPT

## 2019-12-03 RX ORDER — LIDOCAINE HYDROCHLORIDE 10 MG/ML
5 INJECTION, SOLUTION INFILTRATION; PERINEURAL ONCE
Status: COMPLETED | OUTPATIENT
Start: 2019-12-03 | End: 2019-12-03

## 2019-12-03 RX ADMIN — LIDOCAINE HYDROCHLORIDE 5 ML: 10 INJECTION, SOLUTION INFILTRATION; PERINEURAL at 08:46

## 2019-12-03 NOTE — PROGRESS NOTES
Ice pack given:    ___x__yes _____no    Discharge instructions signed by patient:    ___x__yes _____no    Discharge instructions given to patient:    ___x__yes _____no    Discharged via:    ___x__amulatory    _____wheelchair    _____stretcher    Stable on discharge:    ___x__yes ____no

## 2019-12-03 NOTE — PROGRESS NOTES
Procedure type:    __x___ultrasound guided _____stereotactic    Breast:    _____Left ___x__Right Cyst aspiration    Location: 7 o'clock 3 CMFN    Needle: 18 gauge precision glide needle     # of passes: 1    Clip: N/A    Performed by: Dr Mino Ace held for 5 minutes by: Brady Ching Strips:    _____yes __x___no    Band aid:    ___x__yes_____no    Tape and guaze:    _____yes ___x__no    Tolerated procedure:    __x___yes _____no

## 2019-12-03 NOTE — DISCHARGE INSTR - OTHER ORDERS
POST LARGE CORE BREAST BIOPSY PATIENT INFORMATION      1  Place an ice pack inside your bra over the top of the dressing every hour for 20 minutes (20 minutes on, 60 minutes off)  Do this until bedtime  2  Do not shower or bathe until the following morning  3  You may bathe your breast carefully with the steri-strips in place  Be careful    Not to loosen them  The steri-strips will fall off in 3-5 days  4  You may have mild discomfort, and you may have some bruising where the   Needle entered the skin  This should clear within 5-7 days  5  If you need medicine for discomfort, take acetaminophen products such as   Tylenol  You may also take Advil or Motrin products  6  Do not participate in strenuous activities such as-tennis, aerobics, skiing,  Weight lifting, etc  for 24 hours  Refrain from swimming/soaking for 72 hours  7  Wearing a bra for sleeping may be more comfortable for the first 24-48 hours  8  Watch for continued bleeding, pain or fever over 101; please call with any questions or concerns  For procedures done at the [facility] call:  Katrin Francis RN at [phone]  Pino Morales RN at [phone]                    *After 4 PM call the Interventional Radiology Department                    [phone] and ask to speak with the nurse on call  For procedures done at the [address] [place] [address] call:         Rodriguez Dooley RN at   *After 4 PM call the Interventional Radiology Department   [phone] and ask to speak with the nurse on call  For procedures done at [address] [place] [address] call: The Radiology Nurse at [phone]  *After 4 PM call your physician, or go to the Emergency Department  9          The final results of your biopsy are usually available within one week

## 2019-12-04 ENCOUNTER — TELEPHONE (OUTPATIENT)
Dept: HEMATOLOGY ONCOLOGY | Facility: CLINIC | Age: 44
End: 2019-12-04

## 2019-12-04 NOTE — TELEPHONE ENCOUNTER
New Patient Encounter    New Patient Intake Form   Patient Details:  Clearance Binh  1975  [id number]    Background Information:  [address] starts by opening a telephone encounter and gathering the following information   Who is calling to schedule? If not self, relationship to patient? Self   Referring Provider Dr Rosendo Thompson is the diagnosis? Breast Mass   When was the diagnosis? 12/2019   Is patient aware of diagnosis? Yes   Reason for visit? Second Opinion   Have you had any testing done? If so: when, where? Yes   Are records in [employer]? yes   Was the patient told to bring a disk? no   Scheduling Information:   Preferred [place]: [facility]? Dr Arvind Sunshine   Are there any dates/time the patient cannot be seen? no      Miscellaneous:    After completing the above information, please route to Financial Counselor and the appropriate Nurse Navigator for review

## 2019-12-04 NOTE — PROGRESS NOTES
Post procedure call completed    Bleeding: _____yes __X___no    Pain: _____yes ___X___no (Pt denies)    Redness/Swelling: ______yes ___X___no (Pt denies)    Band aid removed: __X___yes _____no    Pt with no questions at this time, adv to call with any questions or concerns, has name/# for contact

## 2020-01-03 PROBLEM — N60.01 CYST OF RIGHT BREAST: Status: ACTIVE | Noted: 2020-01-03

## 2020-01-08 ENCOUNTER — CONSULT (OUTPATIENT)
Dept: SURGICAL ONCOLOGY | Facility: CLINIC | Age: 45
End: 2020-01-08
Payer: COMMERCIAL

## 2020-01-08 VITALS
DIASTOLIC BLOOD PRESSURE: 80 MMHG | RESPIRATION RATE: 16 BRPM | HEART RATE: 102 BPM | BODY MASS INDEX: 23.9 KG/M2 | SYSTOLIC BLOOD PRESSURE: 140 MMHG | TEMPERATURE: 98.2 F | HEIGHT: 64 IN | WEIGHT: 140 LBS

## 2020-01-08 DIAGNOSIS — Z80.3 FAMILY HISTORY OF BREAST CANCER: ICD-10-CM

## 2020-01-08 DIAGNOSIS — N60.01 CYST OF RIGHT BREAST: Primary | ICD-10-CM

## 2020-01-08 PROCEDURE — 99243 OFF/OP CNSLTJ NEW/EST LOW 30: CPT | Performed by: SURGERY

## 2020-01-08 RX ORDER — ALPRAZOLAM 0.25 MG/1
TABLET ORAL
COMMUNITY
End: 2022-03-16

## 2020-01-14 ENCOUNTER — TELEPHONE (OUTPATIENT)
Dept: SURGICAL ONCOLOGY | Facility: CLINIC | Age: 45
End: 2020-01-14

## 2020-01-14 NOTE — TELEPHONE ENCOUNTER
Called patient to follow up regarding recommendations for further imaging  There was no answer  Left a message stating that Dr Hailey Salazar spoke with Dr Bindu Burton who reviewed her images and felt comfortable with a 6 month follow up mammogram - which is already scheduled  Instructed patient to continue following up with her gynecologist and if she should appreciate any changes, to call our office  Call back number provided if the patient had any questions

## 2020-03-12 ENCOUNTER — ANNUAL EXAM (OUTPATIENT)
Dept: OBGYN CLINIC | Facility: CLINIC | Age: 45
End: 2020-03-12
Payer: COMMERCIAL

## 2020-03-12 VITALS
SYSTOLIC BLOOD PRESSURE: 132 MMHG | HEIGHT: 64 IN | WEIGHT: 141 LBS | HEART RATE: 107 BPM | DIASTOLIC BLOOD PRESSURE: 92 MMHG | OXYGEN SATURATION: 98 % | BODY MASS INDEX: 24.07 KG/M2

## 2020-03-12 DIAGNOSIS — Z11.3 SCREENING EXAMINATION FOR STD (SEXUALLY TRANSMITTED DISEASE): ICD-10-CM

## 2020-03-12 DIAGNOSIS — N92.0 MENORRHAGIA WITH REGULAR CYCLE: ICD-10-CM

## 2020-03-12 DIAGNOSIS — N92.3 IMB (INTERMENSTRUAL BLEEDING): ICD-10-CM

## 2020-03-12 DIAGNOSIS — Z12.4 ENCOUNTER FOR PAPANICOLAOU SMEAR FOR CERVICAL CANCER SCREENING: ICD-10-CM

## 2020-03-12 DIAGNOSIS — Z11.51 SCREENING FOR HPV (HUMAN PAPILLOMAVIRUS): ICD-10-CM

## 2020-03-12 DIAGNOSIS — Z01.411 ENCOUNTER FOR GYNECOLOGICAL EXAMINATION WITH ABNORMAL FINDING: Primary | ICD-10-CM

## 2020-03-12 DIAGNOSIS — Z30.41 SURVEILLANCE FOR BIRTH CONTROL, ORAL CONTRACEPTIVES: ICD-10-CM

## 2020-03-12 PROCEDURE — S0612 ANNUAL GYNECOLOGICAL EXAMINA: HCPCS | Performed by: NURSE PRACTITIONER

## 2020-03-12 RX ORDER — NORGESTIMATE AND ETHINYL ESTRADIOL 0.25-0.035
1 KIT ORAL DAILY
Qty: 84 TABLET | Refills: 4 | Status: SHIPPED | OUTPATIENT
Start: 2020-03-12 | End: 2021-04-07

## 2020-03-12 NOTE — PROGRESS NOTES
Assessment / Plan    1  Encounter for gynecological examination with abnormal finding  Well woman exam   pap/hpv negative-- repeated this year due to IMB complaint and friable cervix  G/C screening as well due to PCB & friable cervix    2  Encounter for Papanicolaou smear for cervical cancer screening      3  Screening for HPV (human papillomavirus)      4  Surveillance for birth control, oral contraceptives  Doing better on Sprintec, however still has bleeding mid packet and only after sex  Continue OCP for now until bleeding further evaluated  5  Screening examination for STD (sexually transmitted disease)      6  IMB (intermenstrual bleeding)  As above-- pap w/ HPV, G/C screening obtained  RV for endo biopsy-- pelvic US last year with 1 cm intramural uterine fibroid  Will consider Mirena IUD  Request for insurance coverage sent  Subjective      Ashleynaomi Ballard is a 38 y o  female who presents for her annual gynecologic exam     History recurrent DUB on prior OCP, Lo Ovral   [facility] four months ago  Has been doing much better with no break through bleeding except after sex on days 14-16 of pill packet  Bright red, fair amount, but stops right away  3/2019 US one small ut fib 1 1cm, eml 2mm  TSH & cbc were normal    2018 pap/hpv neg  3/2018 g/c neg  2019 breast cyst aspiration on right; f/u of other mass in 6 mo  She is following with Dr Micheal Yung due to her mother's history  Periods are regular, on OCP, but with IMB after sex    Current contraception: OCP (estrogen/progesterone)  History of abnormal Pap smear: no  Family history of breast,uterine, ovarian or colon cancer: yes - mother with breast ca, she just passed away 2020, BRCA negative      Menstrual History:  OB History        3    Para   2    Term   2            AB        Living   2       SAB        TAB        Ectopic        Multiple        Live Births               Obstetric Comments   Age of menarche 14  Age at first live birth 24              Patient's last menstrual period was 03/05/2020 (exact date)  The following portions of the patient's history were reviewed and updated as appropriate: allergies, current medications, past family history, past medical history, past social history, past surgical history and problem list     Review of Systems      Review of Systems   Constitutional: Negative for chills and fever  Gastrointestinal: Negative for abdominal distention, abdominal pain, blood in stool, constipation, diarrhea, nausea and vomiting  Genitourinary: Positive for vaginal bleeding  Negative for difficulty urinating, dysuria, frequency, genital sores, hematuria, menstrual problem, pelvic pain, urgency and vaginal discharge  Breasts:  Negative for skin changes, dimpling, asymmetry, nipple discharge, redness, tenderness or palpable masses    Objective      /92 (BP Location: Left arm, Patient Position: Sitting, Cuff Size: Standard)   Pulse (!) 107   Ht 5' 4" (1 626 m)   Wt 64 kg (141 lb)   LMP 03/05/2020 (Exact Date)   SpO2 98%   BMI 24 20 kg/m²      Physical Exam   Constitutional: She is oriented to person, place, and time  She appears well-developed and well-nourished  No distress  HENT:   Head: Normocephalic and atraumatic  Eyes: Pupils are equal, round, and reactive to light  Neck: Neck supple  No thyromegaly present  Pulmonary/Chest: Effort normal  Right breast exhibits no inverted nipple, no mass, no nipple discharge, no skin change and no tenderness  Left breast exhibits no inverted nipple, no mass, no nipple discharge, no skin change and no tenderness  Breasts are symmetrical    Abdominal: Soft  Normal appearance  She exhibits no mass  There is no tenderness  There is no CVA tenderness  Genitourinary: Rectum normal and uterus normal  Pelvic exam was performed with patient supine  No labial fusion   There is no rash, tenderness, lesion or injury on the right labia  There is no rash, tenderness, lesion or injury on the left labia  Uterus is not enlarged and not tender  Cervix exhibits friability  Cervix exhibits no motion tenderness and no discharge  Right adnexum displays no mass, no tenderness and no fullness  Left adnexum displays no mass, no tenderness and no fullness  No erythema, tenderness or bleeding in the vagina  No foreign body in the vagina  No signs of injury around the vagina  No vaginal discharge found  Lymphadenopathy:     She has no cervical adenopathy  She has no axillary adenopathy  Right: No inguinal and no supraclavicular adenopathy present  Left: No inguinal and no supraclavicular adenopathy present  Neurological: She is alert and oriented to person, place, and time  She is not disoriented  Skin: Skin is warm, dry and intact  Psychiatric: She has a normal mood and affect   Her behavior is normal  Thought content normal

## 2020-03-16 LAB
C TRACH RRNA SPEC QL NAA+PROBE: NOT DETECTED
CLINICAL INFO: NORMAL
CYTO CVX: NORMAL
CYTOLOGY CMNT CVX/VAG CYTO-IMP: NORMAL
DATE PREVIOUS BX: NORMAL
HPV E6+E7 MRNA CVX QL NAA+PROBE: NOT DETECTED
LMP START DATE: NORMAL
N GONORRHOEA RRNA SPEC QL NAA+PROBE: NOT DETECTED
SL AMB PREV. PAP:: NORMAL
SPECIMEN SOURCE CVX/VAG CYTO: NORMAL

## 2020-05-06 ENCOUNTER — TELEPHONE (OUTPATIENT)
Dept: OBGYN CLINIC | Facility: CLINIC | Age: 45
End: 2020-05-06

## 2020-06-04 ENCOUNTER — TRANSCRIBE ORDERS (OUTPATIENT)
Dept: MAMMOGRAPHY | Facility: CLINIC | Age: 45
End: 2020-06-04

## 2020-06-04 ENCOUNTER — HOSPITAL ENCOUNTER (OUTPATIENT)
Dept: MAMMOGRAPHY | Facility: CLINIC | Age: 45
Discharge: HOME | End: 2020-06-04
Payer: COMMERCIAL

## 2020-06-04 VITALS — HEIGHT: 64 IN | WEIGHT: 140 LBS | BODY MASS INDEX: 23.9 KG/M2

## 2020-06-04 DIAGNOSIS — Z12.31 SCREENING MAMMOGRAM FOR HIGH-RISK PATIENT: Primary | ICD-10-CM

## 2020-06-04 DIAGNOSIS — R92.8 ABNORMAL MAMMOGRAM: ICD-10-CM

## 2020-06-04 PROCEDURE — G0279 TOMOSYNTHESIS, MAMMO: HCPCS

## 2020-06-04 PROCEDURE — 77065 DX MAMMO INCL CAD UNI: CPT

## 2020-06-15 ENCOUNTER — TELEPHONE (OUTPATIENT)
Dept: OBGYN CLINIC | Facility: CLINIC | Age: 45
End: 2020-06-15

## 2020-06-16 ENCOUNTER — PROCEDURE VISIT (OUTPATIENT)
Dept: OBGYN CLINIC | Facility: CLINIC | Age: 45
End: 2020-06-16
Payer: COMMERCIAL

## 2020-06-16 VITALS
SYSTOLIC BLOOD PRESSURE: 130 MMHG | HEIGHT: 64 IN | TEMPERATURE: 97.2 F | WEIGHT: 144.6 LBS | DIASTOLIC BLOOD PRESSURE: 82 MMHG | BODY MASS INDEX: 24.69 KG/M2

## 2020-06-16 DIAGNOSIS — N92.3 IMB (INTERMENSTRUAL BLEEDING): Primary | ICD-10-CM

## 2020-06-16 PROCEDURE — NC001 PR NO CHARGE: Performed by: NURSE PRACTITIONER

## 2020-06-16 PROCEDURE — 58100 BIOPSY OF UTERUS LINING: CPT | Performed by: NURSE PRACTITIONER

## 2020-06-18 LAB
PROCEDURE TYPE: NORMAL
SPECIMEN SOURCE: NORMAL

## 2020-12-04 ENCOUNTER — HOSPITAL ENCOUNTER (OUTPATIENT)
Dept: MAMMOGRAPHY | Facility: CLINIC | Age: 45
Discharge: HOME | End: 2020-12-04
Payer: COMMERCIAL

## 2020-12-04 VITALS — HEIGHT: 64 IN | WEIGHT: 144 LBS | BODY MASS INDEX: 24.59 KG/M2

## 2020-12-04 DIAGNOSIS — Z12.31 SCREENING MAMMOGRAM FOR HIGH-RISK PATIENT: ICD-10-CM

## 2020-12-04 PROCEDURE — 77067 SCR MAMMO BI INCL CAD: CPT

## 2020-12-04 PROCEDURE — 77063 BREAST TOMOSYNTHESIS BI: CPT

## 2021-03-15 ENCOUNTER — ANNUAL EXAM (OUTPATIENT)
Dept: OBGYN CLINIC | Facility: CLINIC | Age: 46
End: 2021-03-15
Payer: COMMERCIAL

## 2021-03-15 VITALS
HEIGHT: 64 IN | HEART RATE: 98 BPM | WEIGHT: 150 LBS | BODY MASS INDEX: 25.61 KG/M2 | OXYGEN SATURATION: 100 % | DIASTOLIC BLOOD PRESSURE: 88 MMHG | SYSTOLIC BLOOD PRESSURE: 128 MMHG

## 2021-03-15 DIAGNOSIS — Z30.41 ORAL CONTRACEPTIVE PILL SURVEILLANCE: ICD-10-CM

## 2021-03-15 DIAGNOSIS — Z01.411 ENCOUNTER FOR GYNECOLOGICAL EXAMINATION WITH ABNORMAL FINDING: Primary | ICD-10-CM

## 2021-03-15 DIAGNOSIS — Z12.31 ENCOUNTER FOR SCREENING MAMMOGRAM FOR BREAST CANCER: ICD-10-CM

## 2021-03-15 PROCEDURE — S0612 ANNUAL GYNECOLOGICAL EXAMINA: HCPCS | Performed by: NURSE PRACTITIONER

## 2021-03-15 RX ORDER — NORGESTIMATE AND ETHINYL ESTRADIOL 0.25-0.035
1 KIT ORAL DAILY
COMMUNITY
End: 2021-04-07

## 2021-03-15 NOTE — PROGRESS NOTES
Assessment / Plan    1  Encounter for gynecological examination with abnormal finding  Normal well woman exam  3/2020 pap/hpv negative  Repeat      2  Encounter for screening mammogram for breast cancer  Order provided    3  Oral contraceptive pill surveillance  Still having BTB  Also BL blood pressure elevation for past two years  Discussed benefit of LN IUD for both contraception, mgt of bleeding and safety with BP  She will consider  Given pamphlet and insurance info  Advised to follow with PCP for BP  Subjective      Eligio Gould is a 54 y o  female who presents for her annual gynecologic exam     Doing well  Last pap: 3/2020 pap/hpv negative  Last mammogram:  2020 benign  Sexually active:yes  Using Sprintec- still has BTB; 2020 ebx was negative  Still with Rosalynd Kayser, partner of 3 years  Moved in together  BP last year 132/92  This year 128/88    Current contraception: OCP (estrogen/progesterone)  History of abnormal Pap smear: no  Family history of breast,uterine, ovarian or colon cancer: yes - mother with breast ca      Menstrual History:  OB History        3    Para   2    Term   2            AB        Living   2       SAB        TAB        Ectopic        Multiple        Live Births               Obstetric Comments   Age of menarche 14  Age at first live birth 24              Patient's last menstrual period was 2021 (exact date)  The following portions of the patient's history were reviewed and updated as appropriate: allergies, current medications, past family history, past medical history, past social history, past surgical history and problem list     Review of Systems      Review of Systems   Constitutional: Negative for chills and fever  Respiratory: Negative for cough and shortness of breath  Gastrointestinal: Negative for abdominal distention, abdominal pain, blood in stool, constipation, diarrhea, nausea and vomiting     Genitourinary: Positive for menstrual problem (IMB)  Negative for difficulty urinating, dysuria, frequency, genital sores, hematuria, pelvic pain, urgency, vaginal bleeding and vaginal discharge  Musculoskeletal: Negative for arthralgias and myalgias  Breasts:  Negative for skin changes, dimpling, asymmetry, nipple discharge, redness, tenderness or palpable masses    Objective      /88 (BP Location: Left arm, Patient Position: Sitting, Cuff Size: Standard)   Pulse 98   Ht 5' 4" (1 626 m)   Wt 68 kg (150 lb)   LMP 03/09/2021 (Exact Date)   SpO2 100%   BMI 25 75 kg/m²   Physical Exam  Constitutional:       General: She is not in acute distress  Appearance: Normal appearance  She is well-developed  Neck:      Musculoskeletal: Neck supple  Thyroid: No thyromegaly  Chest:      Breasts: Breasts are symmetrical          Right: No inverted nipple, mass, nipple discharge, skin change or tenderness  Left: No inverted nipple, mass, nipple discharge, skin change or tenderness  Abdominal:      Palpations: Abdomen is soft  There is no mass  Tenderness: There is no abdominal tenderness  Genitourinary:     Labia:         Right: No rash, tenderness, lesion or injury  Left: No rash, tenderness, lesion or injury  Vagina: No signs of injury and foreign body  No vaginal discharge, erythema, tenderness or bleeding  Cervix: No cervical motion tenderness, discharge or friability  Uterus: Not enlarged and not tender  Adnexa:         Right: No mass, tenderness or fullness  Left: No mass, tenderness or fullness  Rectum: Normal  Guaiac result negative  Lymphadenopathy:      Cervical: No cervical adenopathy  Upper Body:      Right upper body: No supraclavicular adenopathy  Left upper body: No supraclavicular adenopathy  Skin:     General: Skin is warm and dry  Neurological:      Mental Status: She is alert  She is not disoriented     Psychiatric: Behavior: Behavior normal

## 2021-04-07 ENCOUNTER — PROCEDURE VISIT (OUTPATIENT)
Dept: OBGYN CLINIC | Facility: CLINIC | Age: 46
End: 2021-04-07
Payer: COMMERCIAL

## 2021-04-07 VITALS
SYSTOLIC BLOOD PRESSURE: 128 MMHG | DIASTOLIC BLOOD PRESSURE: 82 MMHG | HEART RATE: 90 BPM | HEIGHT: 64 IN | BODY MASS INDEX: 25.3 KG/M2 | WEIGHT: 148.2 LBS

## 2021-04-07 DIAGNOSIS — Z30.430 ENCOUNTER FOR IUD INSERTION: Primary | ICD-10-CM

## 2021-04-07 LAB — SL AMB POCT URINE HCG: NEGATIVE

## 2021-04-07 PROCEDURE — 58300 INSERT INTRAUTERINE DEVICE: CPT | Performed by: NURSE PRACTITIONER

## 2021-04-07 PROCEDURE — 81025 URINE PREGNANCY TEST: CPT | Performed by: NURSE PRACTITIONER

## 2021-04-07 NOTE — PROGRESS NOTES
Iud insertions    Date/Time: 4/7/2021 9:49 AM  Performed by: CHAD Whitlock  Authorized by: CHAD Whitlock   Universal Protocol:  Consent: Verbal consent obtained  Consent given by: patient  Patient understanding: patient states understanding of the procedure being performed  Patient consent: the patient's understanding of the procedure matches consent given  Procedure consent: procedure consent matches procedure scheduled  Relevant documents: relevant documents present and verified  Patient identity confirmed: verbally with patient        Procedure:     Negative urine pregnancy test: yes      Cervix cleaned and prepped: yes      Speculum placed in vagina: yes      Tenaculum applied to cervix: yes      Uterus sounded: yes      Uterus sound depth (cm):  7 5    IUD inserted with no complications: yes      IUD type:  Mirena    Strings trimmed: yes    Post-procedure:     Patient tolerated procedure well: yes      Patient will follow up after next period: yes    Comments:      Post IUD instructions provided

## 2021-04-08 DIAGNOSIS — Z23 ENCOUNTER FOR IMMUNIZATION: ICD-10-CM

## 2021-05-19 ENCOUNTER — OFFICE VISIT (OUTPATIENT)
Dept: OBGYN CLINIC | Facility: CLINIC | Age: 46
End: 2021-05-19
Payer: COMMERCIAL

## 2021-05-19 VITALS
DIASTOLIC BLOOD PRESSURE: 60 MMHG | HEART RATE: 84 BPM | WEIGHT: 149 LBS | SYSTOLIC BLOOD PRESSURE: 110 MMHG | BODY MASS INDEX: 26.4 KG/M2 | HEIGHT: 63 IN

## 2021-05-19 DIAGNOSIS — N92.0 MENORRHAGIA WITH REGULAR CYCLE: ICD-10-CM

## 2021-05-19 DIAGNOSIS — Z12.31 ENCOUNTER FOR SCREENING MAMMOGRAM FOR BREAST CANCER: ICD-10-CM

## 2021-05-19 DIAGNOSIS — Z30.431 SURVEILLANCE OF INTRAUTERINE CONTRACEPTIVE DEVICE: Primary | ICD-10-CM

## 2021-05-19 PROCEDURE — 99213 OFFICE O/P EST LOW 20 MIN: CPT | Performed by: NURSE PRACTITIONER

## 2021-05-19 NOTE — PROGRESS NOTES
Assessment/Plan:    1  Surveillance of intrauterine contraceptive device  Normal IUD check  Still having BTB  Will observe for a couple months and will let me know how her bleeding patterns are  Subjective:      Patient ID: Tatiana Godoy is a 54 y o  female  HPI  FOLLOW UP  CC: IUD check    4/7/21 Mirena IUD inserted for mgt of menorrhagia  Bled for 2 days after insertion then started bleeding lightly on 5/1 or 2nd and has been spotting daily since  No other problems to report  The following portions of the patient's history were reviewed and updated as appropriate: allergies, current medications, past family history, past medical history, past social history, past surgical history and problem list     Review of Systems   Constitutional: Negative for chills and fever  Respiratory: Negative for cough and shortness of breath  Genitourinary: Positive for vaginal bleeding  Negative for difficulty urinating, dysuria, frequency, menstrual problem, pelvic pain, urgency and vaginal discharge  Musculoskeletal: Negative for arthralgias and myalgias  Objective:      /60 (BP Location: Left arm, Patient Position: Sitting, Cuff Size: Standard)   Pulse 84   Ht 5' 3" (1 6 m)   Wt 67 6 kg (149 lb)   LMP 05/01/2021   BMI 26 39 kg/m²        Physical Exam  Constitutional:       Appearance: Normal appearance  She is well-developed and normal weight  HENT:      Head: Normocephalic and atraumatic  Eyes:      Pupils: Pupils are equal, round, and reactive to light  Pulmonary:      Effort: Pulmonary effort is normal    Genitourinary:     General: Normal vulva  Exam position: Lithotomy position  Labia:         Right: No rash, tenderness, lesion or injury  Left: No rash, tenderness, lesion or injury  Vagina: No signs of injury and foreign body  Bleeding (scant, dark, menstrual) present  No vaginal discharge, erythema or tenderness        Cervix: No cervical motion tenderness, discharge or friability  Uterus: Not enlarged and not tender  Adnexa:         Right: No mass or tenderness  Left: No mass or tenderness  Skin:     General: Skin is warm and dry  Neurological:      General: No focal deficit present  Mental Status: She is alert and oriented to person, place, and time  Psychiatric:         Mood and Affect: Mood normal          Behavior: Behavior normal          Thought Content:  Thought content normal          Judgment: Judgment normal

## 2021-08-10 DIAGNOSIS — N76.0 ACUTE VAGINITIS: Primary | ICD-10-CM

## 2021-08-10 RX ORDER — FLUCONAZOLE 150 MG/1
150 TABLET ORAL ONCE
Qty: 2 TABLET | Refills: 0 | Status: SHIPPED | OUTPATIENT
Start: 2021-08-10 | End: 2021-08-10

## 2021-12-07 ENCOUNTER — APPOINTMENT (OUTPATIENT)
Dept: LAB | Facility: MEDICAL CENTER | Age: 46
End: 2021-12-07
Payer: COMMERCIAL

## 2021-12-07 ENCOUNTER — HOSPITAL ENCOUNTER (OUTPATIENT)
Dept: RADIOLOGY | Facility: MEDICAL CENTER | Age: 46
Discharge: HOME | End: 2021-12-07
Payer: COMMERCIAL

## 2021-12-07 VITALS — WEIGHT: 149 LBS | BODY MASS INDEX: 26.4 KG/M2 | HEIGHT: 63 IN

## 2021-12-07 DIAGNOSIS — E78.5 HYPERLIPIDEMIA, UNSPECIFIED HYPERLIPIDEMIA TYPE: ICD-10-CM

## 2021-12-07 DIAGNOSIS — N95.1 SYMPTOMATIC MENOPAUSAL OR FEMALE CLIMACTERIC STATES: ICD-10-CM

## 2021-12-07 DIAGNOSIS — E55.9 VITAMIN D DEFICIENCY, UNSPECIFIED: ICD-10-CM

## 2021-12-07 DIAGNOSIS — Z12.31 ENCOUNTER FOR SCREENING MAMMOGRAM FOR BREAST CANCER: ICD-10-CM

## 2021-12-07 LAB
FSH SERPL-ACNC: 5.3 MIU/ML
IRON SATN MFR SERPL: 18 % (ref 15–50)
IRON SERPL-MCNC: 72 UG/DL (ref 50–170)
TIBC SERPL-MCNC: 405 UG/DL (ref 250–450)

## 2021-12-07 PROCEDURE — 77063 BREAST TOMOSYNTHESIS BI: CPT

## 2021-12-07 PROCEDURE — 77067 SCR MAMMO BI INCL CAD: CPT

## 2021-12-07 PROCEDURE — 83001 ASSAY OF GONADOTROPIN (FSH): CPT

## 2021-12-07 PROCEDURE — 83550 IRON BINDING TEST: CPT

## 2021-12-07 PROCEDURE — 83540 ASSAY OF IRON: CPT

## 2022-02-23 ENCOUNTER — APPOINTMENT (OUTPATIENT)
Dept: RADIOLOGY | Facility: MEDICAL CENTER | Age: 47
End: 2022-02-23
Payer: COMMERCIAL

## 2022-02-23 DIAGNOSIS — M54.2 CERVICALGIA: ICD-10-CM

## 2022-02-23 PROCEDURE — 72040 X-RAY EXAM NECK SPINE 2-3 VW: CPT

## 2022-03-16 ENCOUNTER — ANNUAL EXAM (OUTPATIENT)
Dept: OBGYN CLINIC | Facility: CLINIC | Age: 47
End: 2022-03-16
Payer: COMMERCIAL

## 2022-03-16 VITALS
BODY MASS INDEX: 27.75 KG/M2 | SYSTOLIC BLOOD PRESSURE: 118 MMHG | WEIGHT: 156.6 LBS | HEIGHT: 63 IN | DIASTOLIC BLOOD PRESSURE: 78 MMHG

## 2022-03-16 DIAGNOSIS — Z12.31 ENCOUNTER FOR SCREENING MAMMOGRAM FOR BREAST CANCER: ICD-10-CM

## 2022-03-16 DIAGNOSIS — Z01.419 ENCOUNTER FOR GYNECOLOGICAL EXAMINATION WITHOUT ABNORMAL FINDING: Primary | ICD-10-CM

## 2022-03-16 DIAGNOSIS — Z30.431 SURVEILLANCE OF INTRAUTERINE CONTRACEPTIVE DEVICE: ICD-10-CM

## 2022-03-16 PROCEDURE — S0612 ANNUAL GYNECOLOGICAL EXAMINA: HCPCS | Performed by: NURSE PRACTITIONER

## 2022-03-16 NOTE — PROGRESS NOTES
Assessment / Plan    1  Encounter for gynecological examination without abnormal finding  Normal well woman exam  3/2020 pap/hpv negative  Repeat      2  Surveillance of intrauterine contraceptive device  Normal IUD check  3  Encounter for screening mammogram for breast cancer  Order provided for 2022    - Mammo screening bilateral w 3d & cad; Future      Subjective      May Pemberton is a 51 y o  female who presents for her annual gynecologic exam     52 yo  presents for routine GYN exam     She got  on 21! Doing well, no complaints  Very happy with Mirena IUD, rare periods, occasional spotting     3/2020 pap/hpv negative  Repeat 2021 mammo negative  21 Mirena IUD    Periods are rare  Current contraception: IUD  History of abnormal Pap smear: no  Family history of breast,uterine, ovarian or colon cancer: yes - mother w/ breast ca at age 71, BRCA negative      Menstrual History:  OB History        3    Para   2    Term   2            AB        Living   2       SAB        IAB        Ectopic        Multiple        Live Births               Obstetric Comments   Age of menarche 14  Age at first live birth 24              No LMP recorded  Patient has had an implant  The following portions of the patient's history were reviewed and updated as appropriate: allergies, current medications, past family history, past medical history, past social history, past surgical history and problem list     Review of Systems      Review of Systems   Constitutional: Negative for chills and fever  Respiratory: Negative for cough and shortness of breath  Gastrointestinal: Negative for abdominal distention, abdominal pain, blood in stool, constipation, diarrhea, nausea and vomiting  Genitourinary: Negative for difficulty urinating, dysuria, frequency, genital sores, hematuria, menstrual problem, pelvic pain, urgency, vaginal bleeding and vaginal discharge  Musculoskeletal: Negative for arthralgias and myalgias  Breasts:  Negative for skin changes, dimpling, asymmetry, nipple discharge, redness, tenderness or palpable masses    Objective      /78 (BP Location: Right arm, Patient Position: Sitting, Cuff Size: Adult)   Ht 5' 3" (1 6 m)   Wt 71 kg (156 lb 9 6 oz)   BMI 27 74 kg/m²   Physical Exam  Constitutional:       General: She is not in acute distress  Appearance: Normal appearance  She is well-developed  She is not ill-appearing or diaphoretic  Comments: bmi 27 7   HENT:      Head: Normocephalic and atraumatic  Eyes:      Pupils: Pupils are equal, round, and reactive to light  Neck:      Thyroid: No thyromegaly  Pulmonary:      Effort: Pulmonary effort is normal    Chest:   Breasts: Breasts are symmetrical       Right: No inverted nipple, mass, nipple discharge, skin change, tenderness or supraclavicular adenopathy  Left: No inverted nipple, mass, nipple discharge, skin change, tenderness or supraclavicular adenopathy  Abdominal:      General: There is no distension  Palpations: Abdomen is soft  There is no mass  Tenderness: There is no abdominal tenderness  There is no guarding or rebound  Genitourinary:     General: Normal vulva  Exam position: Lithotomy position  Labia:         Right: No rash, tenderness, lesion or injury  Left: No rash, tenderness, lesion or injury  Vagina: No signs of injury and foreign body  No vaginal discharge, erythema, tenderness or bleeding  Cervix: No cervical motion tenderness, discharge or friability  Uterus: Not enlarged and not tender  Adnexa:         Right: No mass, tenderness or fullness  Left: No mass, tenderness or fullness  Musculoskeletal:      Cervical back: Neck supple  Lymphadenopathy:      Cervical: No cervical adenopathy  Upper Body:      Right upper body: No supraclavicular adenopathy        Left upper body: No supraclavicular adenopathy  Skin:     General: Skin is warm and dry  Neurological:      General: No focal deficit present  Mental Status: She is alert and oriented to person, place, and time  She is not disoriented  Psychiatric:         Mood and Affect: Mood normal          Behavior: Behavior normal          Thought Content:  Thought content normal          Judgment: Judgment normal

## 2022-04-13 ENCOUNTER — EVALUATION (OUTPATIENT)
Dept: PHYSICAL THERAPY | Facility: MEDICAL CENTER | Age: 47
End: 2022-04-13
Payer: COMMERCIAL

## 2022-04-13 DIAGNOSIS — M99.02 THORACIC SEGMENT DYSFUNCTION: ICD-10-CM

## 2022-04-13 DIAGNOSIS — M25.552 HIP PAIN, LEFT: ICD-10-CM

## 2022-04-13 DIAGNOSIS — M54.2 CERVICALGIA: ICD-10-CM

## 2022-04-13 DIAGNOSIS — M53.3 SACROILIAC DYSFUNCTION: ICD-10-CM

## 2022-04-13 DIAGNOSIS — M99.01 CERVICAL SEGMENT DYSFUNCTION: Primary | ICD-10-CM

## 2022-04-13 PROCEDURE — 97140 MANUAL THERAPY 1/> REGIONS: CPT | Performed by: PHYSICAL THERAPIST

## 2022-04-13 PROCEDURE — 97161 PT EVAL LOW COMPLEX 20 MIN: CPT | Performed by: PHYSICAL THERAPIST

## 2022-04-13 PROCEDURE — 97112 NEUROMUSCULAR REEDUCATION: CPT | Performed by: PHYSICAL THERAPIST

## 2022-04-13 NOTE — LETTER
2022    [address], [address] [place] [address]    Patient: Radha Ballard   YOB: 1975   Date of Visit: 2022     Encounter Diagnosis     ICD-10-CM    1  Cervical segment dysfunction  M99 01    2  Thoracic segment dysfunction  M99 02    3  Cervicalgia  M54 2    4  Hip pain, left  M25 552    5  Sacroiliac dysfunction  M53 3        Dear Dr Nikolas Panchal:    Thank you for your recent referral of Radha Ballard  Please review the attached evaluation summary from Salima's recent visit  Please verify that you agree with the plan of care by signing the attached order  If you have any questions or concerns, please do not hesitate to call  I sincerely appreciate the opportunity to share in the care of one of your patients and hope to have another opportunity to work with you in the near future  Sincerely,    Hannah Tamez, PT      Referring Provider:      I certify that I have read the below Plan of Care and certify the need for these services furnished under this plan of treatment while under my care  [address]as, [phone] [id number]  Via Fax: [phone]          PT Evaluation     Today's date: 2022  Patient name: Radha Ballard  : 1975  MRN: [id number]  Referring provider: Cuca Haq DO  Dx:   Encounter Diagnosis     ICD-10-CM    1  Cervical segment dysfunction  M99 01    2  Thoracic segment dysfunction  M99 02    3  Cervicalgia  M54 2    4  Hip pain, left  M25 552    5  Sacroiliac dysfunction  M53 3                   Assessment  Assessment details: Radha Ballard is a 51 y o  female who presents with Cervical segment dysfunction  (primary encounter diagnosis)  Thoracic segment dysfunction  Cervicalgia  Hip pain, left  Sacroiliac dysfunction  Patient presents alert and oriented with the above impairments   Sylvain Em will benefit from PT to addres deficits in order to maximize and return to prior level of function  No further referral appears necessary at this time based upon examination results  Prognosis is good given HEP compliance  Please contact me if you have any questions or recommendations  Exam findings do reveal SIJ dysfunction as well as restrictions in cervical region  Focus treatment on stabilizing SIJ w/ increasing core strength and stability and decreasing cervical muscle tightness and restriction  Impairments: abnormal gait, abnormal muscle tone, abnormal or restricted ROM, abnormal movement, activity intolerance, impaired physical strength, lacks appropriate home exercise program, weight-bearing intolerance and poor posture   Understanding of Dx/Px/POC: good   Prognosis: good    Goals  Short Term Goals:   1  Pain decreased 2 ratings in 4 weeks  2  ROM increased 10* in 4 weeks  3  Strength increased 1/2 grade in 4 weeks    Long Term Goals:   1  ADLS/IADLS in related activities improved to maximal level in 8 weeks  2  Work performance improved to maximal level in 8 weeks  3  Recreational activities are improved to maximal level in 8 weeks  4   [place] with HEP in 8 weeks  Plan  Patient would benefit from: skilled physical therapy  Planned modality interventions: cryotherapy  Planned therapy interventions: abdominal trunk stabilization, therapeutic exercise, stretching, strengthening, patient education, neuromuscular re-education, manual therapy, functional ROM exercises, flexibility and home exercise program  Frequency: 2x week  Duration in weeks: 8  Treatment plan discussed with: patient        Subjective Evaluation    History of Present Illness  Mechanism of injury: Pt reports ongoing tightness in L hip  However in September while jogging on treadmill she felt pain in her anterior hip  Pain has worsened since then and exacerbated w/ prolonged walking, ascending stairs  Pain at times radiates into anterior thigh and buttock      Hip pain 0/10 at best, 8/10 at worst  She also reports onset of neck pain in December that was very severe in January and February  She has been seeing massage therapist regularly which has helped to improve mobility  Right side is more severe  She is no longer experiencing radicular symptoms, but was initially  Pain is most severe in the mornings and at times does result in sleep disturbance  She also has pain w/ cervical rotation and flexion  She would like to fully return to working out regularly  Pain  At best pain ratin  At worst pain ratin  Location: neck    Patient Goals  Patient goals for therapy: decreased pain, independence with ADLs/IADLs, increased strength, increased motion and return to sport/leisure activities          Objective     Palpation     Additional Palpation Details  Tightness and tenderness over B rhomboid, UT, sub-occipitals;  Limited 1st rib mobility  Sig tenderness and restriction over L glute med  Active Range of Motion   Cervical/Thoracic Spine       Cervical    Flexion: 40 degrees   Extension: 40 degrees      Left lateral flexion: 20 degrees      Right lateral flexion: 20 degrees      Left rotation: 50 degrees  Right rotation: 62 degrees         Left Shoulder   Normal active range of motion    Right Shoulder   Normal active range of motion    Strength/Myotome Testing     Left Shoulder     Planes of Motion   Flexion: 5   Abduction: 5   External rotation at 90°: 5   Internal rotation at 90°: 5     Right Shoulder     Planes of Motion   Flexion: 5   Abduction: 5   External rotation at 90°: 5   Internal rotation at 90°: 5     Left Hip   Planes of Motion   Flexion: 5  Extension: 4+  Abduction: 4    Right Hip   Planes of Motion   Flexion: 5  Extension: 4+  Abduction: 4    Tests     Left Hip   Positive long sit       Additional Tests Details  Left anterior torsion             Precautions: none      Manuals             Scissor MET L ant torsion FELIX            Graston L glute med 5 min TPR B sub-occipitals 5 min                         Neuro Re-Ed 4/13            ppt 5"x20            bridges 5"x20            clamshells 5"x20            Prone hip ext nv                                                                                          TherEx 4/13            B UT stretch 30"x3            B levator stretch 30"x3            Piriformis stretch nv            Hamstring stretch nv                         Ther Activity                                       Gait Training                                       Modalities

## 2022-04-13 NOTE — PROGRESS NOTES
PT Evaluation     Today's date: 2022  Patient name: Dunia Gao  : 1975  MRN: [id number]  Referring provider: Nathan Love DO  Dx:   Encounter Diagnosis     ICD-10-CM    1  Cervical segment dysfunction  M99 01    2  Thoracic segment dysfunction  M99 02    3  Cervicalgia  M54 2    4  Hip pain, left  M25 552    5  Sacroiliac dysfunction  M53 3                   Assessment  Assessment details: Dunia Gao is a 51 y o  female who presents with Cervical segment dysfunction  (primary encounter diagnosis)  Thoracic segment dysfunction  Cervicalgia  Hip pain, left  Sacroiliac dysfunction  Patient presents alert and oriented with the above impairments  Raoul Harman will benefit from PT to addres deficits in order to maximize and return to prior level of function  No further referral appears necessary at this time based upon examination results  Prognosis is good given HEP compliance  Please contact me if you have any questions or recommendations  Exam findings do reveal SIJ dysfunction as well as restrictions in cervical region  Focus treatment on stabilizing SIJ w/ increasing core strength and stability and decreasing cervical muscle tightness and restriction  Impairments: abnormal gait, abnormal muscle tone, abnormal or restricted ROM, abnormal movement, activity intolerance, impaired physical strength, lacks appropriate home exercise program, weight-bearing intolerance and poor posture   Understanding of Dx/Px/POC: good   Prognosis: good    Goals  Short Term Goals:   1  Pain decreased 2 ratings in 4 weeks  2  ROM increased 10* in 4 weeks  3  Strength increased 1/2 grade in 4 weeks    Long Term Goals:   1  ADLS/IADLS in related activities improved to maximal level in 8 weeks  2  Work performance improved to maximal level in 8 weeks  3  Recreational activities are improved to maximal level in 8 weeks  4   [place] with HEP in 8 weeks      Plan  Patient would benefit from: skilled physical therapy  Planned modality interventions: cryotherapy  Planned therapy interventions: abdominal trunk stabilization, therapeutic exercise, stretching, strengthening, patient education, neuromuscular re-education, manual therapy, functional ROM exercises, flexibility and home exercise program  Frequency: 2x week  Duration in weeks: 8  Treatment plan discussed with: patient        Subjective Evaluation    History of Present Illness  Mechanism of injury: Pt reports ongoing tightness in L hip  However in September while jogging on treadmill she felt pain in her anterior hip  Pain has worsened since then and exacerbated w/ prolonged walking, ascending stairs  Pain at times radiates into anterior thigh and buttock  Hip pain 0/10 at best, 8/10 at worst  She also reports onset of neck pain in December that was very severe in January and February  She has been seeing massage therapist regularly which has helped to improve mobility  Right side is more severe  She is no longer experiencing radicular symptoms, but was initially  Pain is most severe in the mornings and at times does result in sleep disturbance  She also has pain w/ cervical rotation and flexion  She would like to fully return to working out regularly  Pain  At best pain ratin  At worst pain ratin  Location: neck    Patient Goals  Patient goals for therapy: decreased pain, independence with ADLs/IADLs, increased strength, increased motion and return to sport/leisure activities          Objective     Palpation     Additional Palpation Details  Tightness and tenderness over B rhomboid, UT, sub-occipitals;  Limited 1st rib mobility  Sig tenderness and restriction over L glute med          Active Range of Motion   Cervical/Thoracic Spine       Cervical    Flexion: 40 degrees   Extension: 40 degrees      Left lateral flexion: 20 degrees      Right lateral flexion: 20 degrees      Left rotation: 50 degrees  Right rotation: 62 degrees Left Shoulder   Normal active range of motion    Right Shoulder   Normal active range of motion    Strength/Myotome Testing     Left Shoulder     Planes of Motion   Flexion: 5   Abduction: 5   External rotation at 90°: 5   Internal rotation at 90°: 5     Right Shoulder     Planes of Motion   Flexion: 5   Abduction: 5   External rotation at 90°: 5   Internal rotation at 90°: 5     Left Hip   Planes of Motion   Flexion: 5  Extension: 4+  Abduction: 4    Right Hip   Planes of Motion   Flexion: 5  Extension: 4+  Abduction: 4    Tests     Left Hip   Positive long sit       Additional Tests Details  Left anterior torsion             Precautions: none      Manuals 4/13            Scissor MET L ant torsion FELIX            Graston L glute med 5 min            TPR B sub-occipitals 5 min                         Neuro Re-Ed 4/13            ppt 5"x20            bridges 5"x20            clamshells 5"x20            Prone hip ext nv                                                                                          TherEx 4/13            B UT stretch 30"x3            B levator stretch 30"x3            Piriformis stretch nv            Hamstring stretch nv                         Ther Activity                                       Gait Training                                       Modalities

## 2022-04-18 ENCOUNTER — OFFICE VISIT (OUTPATIENT)
Dept: PHYSICAL THERAPY | Facility: MEDICAL CENTER | Age: 47
End: 2022-04-18
Payer: COMMERCIAL

## 2022-04-18 DIAGNOSIS — M54.2 CERVICALGIA: ICD-10-CM

## 2022-04-18 DIAGNOSIS — M99.01 CERVICAL SEGMENT DYSFUNCTION: Primary | ICD-10-CM

## 2022-04-18 DIAGNOSIS — M25.552 HIP PAIN, LEFT: ICD-10-CM

## 2022-04-18 DIAGNOSIS — M53.3 SACROILIAC DYSFUNCTION: ICD-10-CM

## 2022-04-18 DIAGNOSIS — M99.02 THORACIC SEGMENT DYSFUNCTION: ICD-10-CM

## 2022-04-18 PROCEDURE — 97140 MANUAL THERAPY 1/> REGIONS: CPT | Performed by: PHYSICAL THERAPIST

## 2022-04-18 PROCEDURE — 97112 NEUROMUSCULAR REEDUCATION: CPT | Performed by: PHYSICAL THERAPIST

## 2022-04-18 NOTE — PROGRESS NOTES
Daily Note     Today's date: 2022  Patient name: Yunior Landis  : 1975  MRN: [id number]  Referring provider: Nabeel Neal DO  Dx:   Encounter Diagnosis     ICD-10-CM    1  Cervical segment dysfunction  M99 01    2  Thoracic segment dysfunction  M99 02    3  Cervicalgia  M54 2    4  Hip pain, left  M25 552    5  Sacroiliac dysfunction  M53 3                   Subjective: Pt believes SIJ rotated again over the weekend  Ongoing soreness in L hip region  Neck feels better after manuals at [facility]  Objective: See treatment diary below      Assessment: Tolerated treatment well  Patient demonstrated fatigue post treatment, exhibited good technique with therapeutic exercises and would benefit from continued PT   Sig restrictions noted over L glute med and piriformis  Long sit test today revealed L posterior torsion; which was corrected w/ MET  Palpable tightness noted in L sub-occipitals and R 1st rib  Plan: Continue per plan of care        Precautions: none      Manuals            Scissor MET L ant torsion FELIX L post torsion           Graston L glute med 5 min 10 min           TPR B sub-occipitals 5 min 10 min w/ 1st rib mobs                        Neuro Re-Ed            ppt 5"x20 hep           bridges 5"x20 5"x20           clamshells 5"x20 5"x20           Prone hip ext nv x20                                                                                         TherEx            B UT stretch 30"x3 hep           B levator stretch 30"x3 hep           Piriformis stretch nv 30"x3           Hamstring stretch nv 30"x3                        Ther Activity                                       Gait Training                                       Modalities

## 2022-04-22 ENCOUNTER — OFFICE VISIT (OUTPATIENT)
Dept: PHYSICAL THERAPY | Facility: MEDICAL CENTER | Age: 47
End: 2022-04-22
Payer: COMMERCIAL

## 2022-04-22 DIAGNOSIS — M54.2 CERVICALGIA: ICD-10-CM

## 2022-04-22 DIAGNOSIS — M99.02 THORACIC SEGMENT DYSFUNCTION: ICD-10-CM

## 2022-04-22 DIAGNOSIS — M25.552 HIP PAIN, LEFT: ICD-10-CM

## 2022-04-22 DIAGNOSIS — M53.3 SACROILIAC DYSFUNCTION: ICD-10-CM

## 2022-04-22 DIAGNOSIS — M99.01 CERVICAL SEGMENT DYSFUNCTION: Primary | ICD-10-CM

## 2022-04-22 PROCEDURE — 97112 NEUROMUSCULAR REEDUCATION: CPT | Performed by: PHYSICAL THERAPIST

## 2022-04-22 PROCEDURE — 97140 MANUAL THERAPY 1/> REGIONS: CPT | Performed by: PHYSICAL THERAPIST

## 2022-04-22 NOTE — PROGRESS NOTES
Daily Note     Today's date: 2022  Patient name: Gatha Homans  : 1975  MRN: [id number]  Referring provider: Axel Arreola DO  Dx:   Encounter Diagnosis     ICD-10-CM    1  Cervical segment dysfunction  M99 01    2  Thoracic segment dysfunction  M99 02    3  Cervicalgia  M54 2    4  Hip pain, left  M25 552    5  Sacroiliac dysfunction  M53 3                   Subjective: Pt felt relief following last visit  Pain and stiffness did again increase as of this morning  Mostly noted in L anterior/lateral hip as well as bilateral UT region  Objective: See treatment diary below      Assessment: Tolerated treatment well  Patient demonstrated fatigue post treatment, exhibited good technique with therapeutic exercises and would benefit from continued PT   Negative long sit test today  Petechiae present over L glute med and QL  Restricted over right 1st rib  She does continue to report relief w/ manuals  She remains compliant w/ self stretches  Plan: Continue per plan of care        Precautions: none      Manuals           Scissor MET L ant torsion FELIX L post torsion np          Graston L glute med 5 min 10 min 15 min          TPR B sub-occipitals 5 min 10 min w/ 1st rib mobs 15 min w/ 1st rib                       Neuro Re-Ed           ppt 5"x20 hep           bridges 5"x20 5"x20 5"x20          clamshells 5"x20 5"x20 5"x20          Prone hip ext nv x20 x20                                                                                        TherEx           B UT stretch 30"x3 hep 30"x3          B levator stretch 30"x3 hep 30"x3          Piriformis stretch nv 30"x3 30"x3          Hamstring stretch nv 30"x3 30"x3                       Ther Activity                                       Gait Training                                       Modalities

## 2022-04-25 ENCOUNTER — OFFICE VISIT (OUTPATIENT)
Dept: PHYSICAL THERAPY | Facility: MEDICAL CENTER | Age: 47
End: 2022-04-25
Payer: COMMERCIAL

## 2022-04-25 DIAGNOSIS — M99.02 THORACIC SEGMENT DYSFUNCTION: ICD-10-CM

## 2022-04-25 DIAGNOSIS — M25.552 HIP PAIN, LEFT: ICD-10-CM

## 2022-04-25 DIAGNOSIS — M99.01 CERVICAL SEGMENT DYSFUNCTION: Primary | ICD-10-CM

## 2022-04-25 DIAGNOSIS — M54.2 CERVICALGIA: ICD-10-CM

## 2022-04-25 DIAGNOSIS — M53.3 SACROILIAC DYSFUNCTION: ICD-10-CM

## 2022-04-25 PROCEDURE — 97140 MANUAL THERAPY 1/> REGIONS: CPT | Performed by: PHYSICAL THERAPIST

## 2022-04-25 NOTE — PROGRESS NOTES
Daily Note     Today's date: 2022  Patient name: Saad Green  : 1975  MRN: [id number]  Referring provider: Heber Cheng DO  Dx:   No diagnosis found  Subjective: Pt reports continued improvement in neck; overall feeling much better  Left hip pain persisting most noted in glute med, anterior hip, and piriformis  Objective: See treatment diary below      Assessment: Tolerated treatment well  Patient demonstrated fatigue post treatment, exhibited good technique with therapeutic exercises and would benefit from continued PT   Long sit test remaining negative  Petechiae persisting over piriformis  Decreased tightness over cervical musculature  Added hip flexor and QL stretches w/ good tolerance  Plan: Continue per plan of care        Precautions: none      Manuals          Scissor MET L ant torsion FELIX L post torsion np          Graston L glute med, piriformis, QL 5 min 10 min 15 min 15 min         TPR B sub-occipitals 5 min 10 min w/ 1st rib mobs 15 min w/ 1st rib 10 min         Manual sidelying quad and QL stretch    5 min         Neuro Re-Ed          ppt 5"x20 hep           bridges 5"x20 5"x20 5"x20 5"x20         clamshells 5"x20 5"x20 5"x20 5"x20         Prone hip ext nv x20 x20 x20                                                                                       TherEx          B UT stretch 30"x3 hep 30"x3 30"x3         B levator stretch 30"x3 hep 30"x3 30"x3         Piriformis stretch nv 30"x3 30"x3 30"x3         Hamstring stretch nv 30"x3 30"x3 30"x3         Kneeling hip flexor and QL stretch    30"x3         Ther Activity                                       Gait Training                                       Modalities

## 2022-04-26 ENCOUNTER — APPOINTMENT (OUTPATIENT)
Dept: PHYSICAL THERAPY | Facility: MEDICAL CENTER | Age: 47
End: 2022-04-26
Payer: COMMERCIAL

## 2022-04-29 ENCOUNTER — OFFICE VISIT (OUTPATIENT)
Dept: PHYSICAL THERAPY | Facility: MEDICAL CENTER | Age: 47
End: 2022-04-29
Payer: COMMERCIAL

## 2022-04-29 DIAGNOSIS — M99.01 CERVICAL SEGMENT DYSFUNCTION: Primary | ICD-10-CM

## 2022-04-29 DIAGNOSIS — M99.02 THORACIC SEGMENT DYSFUNCTION: ICD-10-CM

## 2022-04-29 DIAGNOSIS — M53.3 SACROILIAC DYSFUNCTION: ICD-10-CM

## 2022-04-29 DIAGNOSIS — M25.552 HIP PAIN, LEFT: ICD-10-CM

## 2022-04-29 DIAGNOSIS — M54.2 CERVICALGIA: ICD-10-CM

## 2022-04-29 PROCEDURE — 97112 NEUROMUSCULAR REEDUCATION: CPT | Performed by: PHYSICAL THERAPIST

## 2022-04-29 PROCEDURE — 97140 MANUAL THERAPY 1/> REGIONS: CPT | Performed by: PHYSICAL THERAPIST

## 2022-04-29 PROCEDURE — 97110 THERAPEUTIC EXERCISES: CPT | Performed by: PHYSICAL THERAPIST

## 2022-04-29 NOTE — PROGRESS NOTES
Daily Note     Today's date: 2022  Patient name: Crystal Purcell  : 1975  MRN: [id number]  Referring provider: Angela Williamson DO  Dx:   Encounter Diagnosis     ICD-10-CM    1  Cervical segment dysfunction  M99 01    2  Thoracic segment dysfunction  M99 02    3  Cervicalgia  M54 2    4  Hip pain, left  M25 552    5  Sacroiliac dysfunction  M53 3                   Subjective: Pt reports improvement following last visit  [place] relief that night w/ longer carryover throughout the week  Tightness in L hip gradually returning  Neck continuously improving  Objective: See treatment diary below      Assessment: Tolerated treatment well  Patient demonstrated fatigue post treatment, exhibited good technique with therapeutic exercises and would benefit from continued PT   Negative long sit  Most tightness and restriction persisting in piriformis  Minimal restriction in cervical region  Plan: Continue per plan of care        Precautions: none      Manuals         Scissor MET L ant torsion FELIX L post torsion np          Graston L glute med, piriformis, QL 5 min 10 min 15 min 15 min 15 min        TPR B sub-occipitals 5 min 10 min w/ 1st rib mobs 15 min w/ 1st rib 10 min 10 min        Manual sidelying quad and QL stretch    5 min 5 min        Neuro Re-Ed         ppt 5"x20 hep           bridges 5"x20 5"x20 5"x20 5"x20 5"x20        clamshells 5"x20 5"x20 5"x20 5"x20 5"x20        Prone hip ext nv x20 x20 x20 x20                                                                                      TherEx         B UT stretch 30"x3 hep 30"x3 30"x3 30"x3        B levator stretch 30"x3 hep 30"x3 30"x3 30"x3        Piriformis stretch nv 30"x3 30"x3 30"x3 30"x3        Hamstring stretch nv 30"x3 30"x3 30"x3 30"x3        Kneeling hip flexor and QL stretch    30"x3 30"x3        Ther Activity                                       Gait Training                                       Modalities

## 2022-05-03 ENCOUNTER — OFFICE VISIT (OUTPATIENT)
Dept: PHYSICAL THERAPY | Facility: MEDICAL CENTER | Age: 47
End: 2022-05-03
Payer: COMMERCIAL

## 2022-05-03 DIAGNOSIS — M54.2 CERVICALGIA: ICD-10-CM

## 2022-05-03 DIAGNOSIS — M25.552 HIP PAIN, LEFT: ICD-10-CM

## 2022-05-03 DIAGNOSIS — M99.01 CERVICAL SEGMENT DYSFUNCTION: Primary | ICD-10-CM

## 2022-05-03 DIAGNOSIS — M99.02 THORACIC SEGMENT DYSFUNCTION: ICD-10-CM

## 2022-05-03 DIAGNOSIS — M53.3 SACROILIAC DYSFUNCTION: ICD-10-CM

## 2022-05-03 PROCEDURE — 97140 MANUAL THERAPY 1/> REGIONS: CPT | Performed by: PHYSICAL THERAPIST

## 2022-05-03 PROCEDURE — 97112 NEUROMUSCULAR REEDUCATION: CPT | Performed by: PHYSICAL THERAPIST

## 2022-05-03 PROCEDURE — 97110 THERAPEUTIC EXERCISES: CPT | Performed by: PHYSICAL THERAPIST

## 2022-05-03 NOTE — PROGRESS NOTES
Daily Note     Today's date: 5/3/2022  Patient name: Marylu Nice  : 1975  MRN: [id number]  Referring provider: Kavin Mishra DO  Dx:   Encounter Diagnosis     ICD-10-CM    1  Cervical segment dysfunction  M99 01    2  Thoracic segment dysfunction  M99 02    3  Hip pain, left  M25 552    4  Cervicalgia  M54 2    5  Sacroiliac dysfunction  M53 3                   Subjective: Pt reports continued improvement  Hip/low back feeling better, decreased frequency of increased pain levels  Neck significantly improved  Objective: See treatment diary below      Assessment: Tolerated treatment well  Patient demonstrated fatigue post treatment, exhibited good technique with therapeutic exercises and would benefit from continued PT   Negative long sit  Tightness continuing to decrease in glute med/piriformis  Minimal tightness in cervical musculature  Plan: Continue per plan of care        Precautions: none      Manuals  5/3       Scissor MET L ant torsion FELIX L post torsion np          Graston L glute med, piriformis, QL 5 min 10 min 15 min 15 min 15 min 15 min       TPR B sub-occipitals 5 min 10 min w/ 1st rib mobs 15 min w/ 1st rib 10 min 10 min 10 min       Manual sidelying quad and QL stretch    5 min 5 min 5 min       Neuro Re-Ed  5/3       ppt 5"x20 hep           bridges 5"x20 5"x20 5"x20 5"x20 5"x20 5"x20       clamshells 5"x20 5"x20 5"x20 5"x20 5"x20 5"x20       Prone hip ext nv x20 x20 x20 x20 x20                                                                                     TherEx  5/3       B UT stretch 30"x3 hep 30"x3 30"x3 30"x3 30"x3       B levator stretch 30"x3 hep 30"x3 30"x3 30"x3 30"x3       Piriformis stretch nv 30"x3 30"x3 30"x3 30"x3 30"x3       Hamstring stretch nv 30"x3 30"x3 30"x3 30"x3 30"x3       Kneeling hip flexor and QL stretch    30"x3 30"x3        Ther Activity Gait Training                                       Modalities

## 2022-05-05 ENCOUNTER — OFFICE VISIT (OUTPATIENT)
Dept: PHYSICAL THERAPY | Facility: MEDICAL CENTER | Age: 47
End: 2022-05-05
Payer: COMMERCIAL

## 2022-05-05 DIAGNOSIS — M53.3 SACROILIAC DYSFUNCTION: ICD-10-CM

## 2022-05-05 DIAGNOSIS — M54.2 CERVICALGIA: ICD-10-CM

## 2022-05-05 DIAGNOSIS — M99.02 THORACIC SEGMENT DYSFUNCTION: ICD-10-CM

## 2022-05-05 DIAGNOSIS — M25.552 HIP PAIN, LEFT: ICD-10-CM

## 2022-05-05 DIAGNOSIS — M99.01 CERVICAL SEGMENT DYSFUNCTION: Primary | ICD-10-CM

## 2022-05-05 PROCEDURE — 97140 MANUAL THERAPY 1/> REGIONS: CPT | Performed by: PHYSICAL THERAPIST

## 2022-05-05 PROCEDURE — 97112 NEUROMUSCULAR REEDUCATION: CPT | Performed by: PHYSICAL THERAPIST

## 2022-05-05 NOTE — PROGRESS NOTES
Daily Note     Today's date: 2022  Patient name: Leyla Kong  : 1975  MRN: [id number]  Referring provider: Sayda Bell DO  Dx:   Encounter Diagnosis     ICD-10-CM    1  Cervical segment dysfunction  M99 01    2  Thoracic segment dysfunction  M99 02    3  Hip pain, left  M25 552    4  Cervicalgia  M54 2    5  Sacroiliac dysfunction  M53 3                   Subjective:  Pt states neck is feeling really good  She does have some pain and tightness persisting in L hip/buttock region  Objective: See treatment diary below      Assessment: Tolerated treatment well  Patient demonstrated fatigue post treatment, exhibited good technique with therapeutic exercises and would benefit from continued PT   Negative long sit  Restrictions are persisting over glute med and piriformis  Mild tightness only over R 1st rib  Plan: Continue per plan of care        Precautions: none      Manuals 4/13 4/18 4/22 4/25 4/29 5/3 5/      Scissor MET L ant torsion FELIX L post torsion np          Graston L glute med, piriformis, QL 5 min 10 min 15 min 15 min 15 min 15 min 15 min      TPR B sub-occipitals 5 min 10 min w/ 1st rib mobs 15 min w/ 1st rib 10 min 10 min 10 min 10 min      Manual sidelying quad and QL stretch    5 min 5 min 5 min 5 min      Neuro Re-Ed 4/13 4/18 4/22 4/25 4/29 5/3 5/5      ppt 5"x20 hep           bridges 5"x20 5"x20 5"x20 5"x20 5"x20 5"x20 5"x20      clamshells 5"x20 5"x20 5"x20 5"x20 5"x20 5"x20 5"x20      Prone hip ext nv x20 x20 x20 x20 x20 x20                                                                                    TherEx 4/13 4/18 4/20 4/25 4/29 5/3 5/5      B UT stretch 30"x3 hep 30"x3 30"x3 30"x3 30"x3 hep      B levator stretch 30"x3 hep 30"x3 30"x3 30"x3 30"x3 hep      Piriformis stretch nv 30"x3 30"x3 30"x3 30"x3 30"x3 30"x3      Hamstring stretch nv 30"x3 30"x3 30"x3 30"x3 30"x3 30"x3      Kneeling hip flexor and QL stretch    30"x3 30"x3        Ther Activity Gait Training                                       Modalities

## 2022-05-09 ENCOUNTER — OFFICE VISIT (OUTPATIENT)
Dept: PHYSICAL THERAPY | Facility: MEDICAL CENTER | Age: 47
End: 2022-05-09
Payer: COMMERCIAL

## 2022-05-09 DIAGNOSIS — M54.2 CERVICALGIA: ICD-10-CM

## 2022-05-09 DIAGNOSIS — M25.552 HIP PAIN, LEFT: ICD-10-CM

## 2022-05-09 DIAGNOSIS — M99.01 CERVICAL SEGMENT DYSFUNCTION: Primary | ICD-10-CM

## 2022-05-09 DIAGNOSIS — M53.3 SACROILIAC DYSFUNCTION: ICD-10-CM

## 2022-05-09 DIAGNOSIS — M99.02 THORACIC SEGMENT DYSFUNCTION: ICD-10-CM

## 2022-05-09 PROCEDURE — 97140 MANUAL THERAPY 1/> REGIONS: CPT | Performed by: PHYSICAL THERAPIST

## 2022-05-09 NOTE — PROGRESS NOTES
Daily Note     Today's date: 2022  Patient name: Moreno Cordoba  : 1975  MRN: [id number]  Referring provider: Gisel Maguire DO  Dx:   Encounter Diagnosis     ICD-10-CM    1  Cervical segment dysfunction  M99 01    2  Thoracic segment dysfunction  M99 02    3  Hip pain, left  M25 552    4  Cervicalgia  M54 2    5  Sacroiliac dysfunction  M53 3                   Subjective:  Pt reports continued gradual improvement  Most tightness persisting in piriformis  Neck only slightly tight  Objective: See treatment diary below      Assessment: Tolerated treatment well  Patient demonstrated fatigue post treatment, exhibited good technique with therapeutic exercises and would benefit from continued PT   Long sit remains negative  Most tightness today over piriformis and QL    R 1st rib slightly restricted  Plan: Continue per plan of care        Precautions: none      Manuals 4/13 4/18 4/22 4/25 4/29 5/3 5/5 5/9     Scissor MET L ant torsion FELIX L post torsion np          Graston L glute med, piriformis, QL 5 min 10 min 15 min 15 min 15 min 15 min 15 min 15 min     TPR B sub-occipitals 5 min 10 min w/ 1st rib mobs 15 min w/ 1st rib 10 min 10 min 10 min 10 min 10 min     Manual sidelying quad and QL stretch    5 min 5 min 5 min 5 min 5 min     Neuro Re-Ed 4/13 4/18 4/22 4/25 4/29 5/3 5/5 5/9     ppt 5"x20 hep           bridges 5"x20 5"x20 5"x20 5"x20 5"x20 5"x20 5"x20 5"x20     clamshells 5"x20 5"x20 5"x20 5"x20 5"x20 5"x20 5"x20 5"x20     Prone hip ext nv x20 x20 x20 x20 x20 x20 x20                                                                                   TherEx 4/13 4/18 4/20 4/25 4/29 5/3 5/5 5/9     B UT stretch 30"x3 hep 30"x3 30"x3 30"x3 30"x3 hep      B levator stretch 30"x3 hep 30"x3 30"x3 30"x3 30"x3 hep      Piriformis stretch nv 30"x3 30"x3 30"x3 30"x3 30"x3 30"x3 30"x3     Hamstring stretch nv 30"x3 30"x3 30"x3 30"x3 30"x3 30"x3 30"x3     Kneeling hip flexor and QL stretch 30"x3 30"x3        Ther Activity                                       Gait Training                                       Modalities

## 2022-05-11 ENCOUNTER — OFFICE VISIT (OUTPATIENT)
Dept: PHYSICAL THERAPY | Facility: MEDICAL CENTER | Age: 47
End: 2022-05-11
Payer: COMMERCIAL

## 2022-05-11 DIAGNOSIS — M99.01 CERVICAL SEGMENT DYSFUNCTION: Primary | ICD-10-CM

## 2022-05-11 DIAGNOSIS — M53.3 SACROILIAC DYSFUNCTION: ICD-10-CM

## 2022-05-11 DIAGNOSIS — M54.2 CERVICALGIA: ICD-10-CM

## 2022-05-11 DIAGNOSIS — M25.552 HIP PAIN, LEFT: ICD-10-CM

## 2022-05-11 DIAGNOSIS — M99.02 THORACIC SEGMENT DYSFUNCTION: ICD-10-CM

## 2022-05-11 PROCEDURE — 97112 NEUROMUSCULAR REEDUCATION: CPT | Performed by: PHYSICAL THERAPIST

## 2022-05-11 PROCEDURE — 97140 MANUAL THERAPY 1/> REGIONS: CPT | Performed by: PHYSICAL THERAPIST

## 2022-05-11 NOTE — PROGRESS NOTES
Daily Note     Today's date: 2022  Patient name: Yves Forrester  : 1975  MRN: [id number]  Referring provider: Curtis Lezama DO  Dx:   Encounter Diagnosis     ICD-10-CM    1  Cervical segment dysfunction  M99 01    2  Thoracic segment dysfunction  M99 02    3  Hip pain, left  M25 552    4  Cervicalgia  M54 2    5  Sacroiliac dysfunction  M53 3                   Subjective:  Pt reports feeling good this week; has been very active  Not working this week; therefore not sitting for prolonged periods of time  Objective: See treatment diary below      Assessment: Tolerated treatment well  Patient demonstrated fatigue post treatment, exhibited good technique with therapeutic exercises and would benefit from continued PT   She is progressing very well  Long sit negative  Restrictions decreased today w/ graston  Plan: Continue per plan of care        Precautions: none      Manuals 4/13 4/18 4/22 4/25 4/29 5/3 5/5 5/9 5/11    Scissor MET L ant torsion FELIX L post torsion np          Graston L glute med, piriformis, QL 5 min 10 min 15 min 15 min 15 min 15 min 15 min 15 min 15 min    TPR B sub-occipitals 5 min 10 min w/ 1st rib mobs 15 min w/ 1st rib 10 min 10 min 10 min 10 min 10 min 10 min    Manual sidelying quad and QL stretch    5 min 5 min 5 min 5 min 5 min 5 min    Neuro Re-Ed 4/13 4/18 4/22 4/25 4/29 5/3 5/5 5/9 5/11    ppt 5"x20 hep           bridges 5"x20 5"x20 5"x20 5"x20 5"x20 5"x20 5"x20 5"x20 5"x20    clamshells 5"x20 5"x20 5"x20 5"x20 5"x20 5"x20 5"x20 5"x20 5"x20    Prone hip ext nv x20 x20 x20 x20 x20 x20 x20 x20                                                                                  TherEx 4/13 4/18 4/20 4/25 4/29 5/3 5/5 5/9 5/11    B UT stretch 30"x3 hep 30"x3 30"x3 30"x3 30"x3 hep      B levator stretch 30"x3 hep 30"x3 30"x3 30"x3 30"x3 hep      Piriformis stretch nv 30"x3 30"x3 30"x3 30"x3 30"x3 30"x3 30"x3 30"x3    Hamstring stretch nv 30"x3 30"x3 30"x3 30"x3 30"x3 30"x3 30"x3 30"x3    Kneeling hip flexor and QL stretch    30"x3 30"x3        Ther Activity                                       Gait Training                                       Modalities

## 2022-05-19 ENCOUNTER — EVALUATION (OUTPATIENT)
Dept: PHYSICAL THERAPY | Facility: MEDICAL CENTER | Age: 47
End: 2022-05-19
Payer: COMMERCIAL

## 2022-05-19 DIAGNOSIS — M53.3 SACROILIAC DYSFUNCTION: ICD-10-CM

## 2022-05-19 DIAGNOSIS — M25.552 HIP PAIN, LEFT: ICD-10-CM

## 2022-05-19 DIAGNOSIS — M99.01 CERVICAL SEGMENT DYSFUNCTION: Primary | ICD-10-CM

## 2022-05-19 DIAGNOSIS — M54.2 CERVICALGIA: ICD-10-CM

## 2022-05-19 DIAGNOSIS — M99.02 THORACIC SEGMENT DYSFUNCTION: ICD-10-CM

## 2022-05-19 PROCEDURE — 97112 NEUROMUSCULAR REEDUCATION: CPT | Performed by: PHYSICAL THERAPIST

## 2022-05-19 PROCEDURE — 97140 MANUAL THERAPY 1/> REGIONS: CPT | Performed by: PHYSICAL THERAPIST

## 2022-05-23 ENCOUNTER — APPOINTMENT (OUTPATIENT)
Dept: PHYSICAL THERAPY | Facility: MEDICAL CENTER | Age: 47
End: 2022-05-23
Payer: COMMERCIAL

## 2022-05-26 ENCOUNTER — OFFICE VISIT (OUTPATIENT)
Dept: PHYSICAL THERAPY | Facility: MEDICAL CENTER | Age: 47
End: 2022-05-26
Payer: COMMERCIAL

## 2022-05-26 DIAGNOSIS — M53.3 SACROILIAC DYSFUNCTION: ICD-10-CM

## 2022-05-26 DIAGNOSIS — M99.01 CERVICAL SEGMENT DYSFUNCTION: Primary | ICD-10-CM

## 2022-05-26 DIAGNOSIS — M54.2 CERVICALGIA: ICD-10-CM

## 2022-05-26 DIAGNOSIS — M99.02 THORACIC SEGMENT DYSFUNCTION: ICD-10-CM

## 2022-05-26 DIAGNOSIS — M25.552 HIP PAIN, LEFT: ICD-10-CM

## 2022-05-26 PROCEDURE — 97140 MANUAL THERAPY 1/> REGIONS: CPT | Performed by: PHYSICAL THERAPIST

## 2022-05-26 PROCEDURE — 97112 NEUROMUSCULAR REEDUCATION: CPT | Performed by: PHYSICAL THERAPIST

## 2022-05-26 NOTE — PROGRESS NOTES
Daily Note     Today's date: 2022  Patient name: Eligio Gould  : 1975  MRN: [id number]  Referring provider: Candelaria Caballero DO  Dx:   Encounter Diagnosis     ICD-10-CM    1  Cervical segment dysfunction  M99 01    2  Thoracic segment dysfunction  M99 02    3  Hip pain, left  M25 552    4  Cervicalgia  M54 2    5  Sacroiliac dysfunction  M53 3                   Subjective:  Pt reports continued improvement  Mild L hip/back pain returned yesterday; has been feeling good w/ mostly reports of tightness only  Objective: See treatment diary below      Assessment: Tolerated treatment well  Patient demonstrated fatigue post treatment, exhibited good technique with therapeutic exercises and would benefit from continued PT   Most tightness noted today over glute med and sub-occipital   SIJ remaining stabilized  Plan: Continue per plan of care        Precautions: none      Manuals    Scissor MET L ant torsion np            Graston L glute med, piriformis, QL 15 min         15 min   TPR B sub-occipitals 10 min         10 min   Manual sidelying quad and QL stretch 2 min         5 min   Neuro Re-Ed                 bridges 5"x20         5"x 20   clamshells 5"x20         5" x20   Prone hip ext x20         x20                                                                                 TherEx    B UT stretch hep            B levator stretch hep            Piriformis stretch 30"x3         30"x3   Hamstring stretch 30"x3         30"x3                Ther Activity                                       Gait Training                                       Modalities

## 2022-05-31 ENCOUNTER — EVALUATION (OUTPATIENT)
Dept: PHYSICAL THERAPY | Facility: MEDICAL CENTER | Age: 47
End: 2022-05-31
Payer: COMMERCIAL

## 2022-05-31 DIAGNOSIS — M53.3 SACROILIAC DYSFUNCTION: ICD-10-CM

## 2022-05-31 DIAGNOSIS — M25.552 HIP PAIN, LEFT: ICD-10-CM

## 2022-05-31 DIAGNOSIS — M99.02 THORACIC SEGMENT DYSFUNCTION: ICD-10-CM

## 2022-05-31 DIAGNOSIS — M54.2 CERVICALGIA: ICD-10-CM

## 2022-05-31 DIAGNOSIS — M99.01 CERVICAL SEGMENT DYSFUNCTION: Primary | ICD-10-CM

## 2022-05-31 PROCEDURE — 97140 MANUAL THERAPY 1/> REGIONS: CPT | Performed by: PHYSICAL THERAPIST

## 2022-05-31 PROCEDURE — 97112 NEUROMUSCULAR REEDUCATION: CPT | Performed by: PHYSICAL THERAPIST

## 2022-05-31 NOTE — PROGRESS NOTES
PT Re-Evaluation     Today's date: 2022  Patient name: Larissa Florian  : 1975  MRN: [id number]  Referring provider: Carley Argueta DO  Dx:   Encounter Diagnosis     ICD-10-CM    1  Cervical segment dysfunction  M99 01    2  Thoracic segment dysfunction  M99 02    3  Hip pain, left  M25 552    4  Cervicalgia  M54 2    5  Sacroiliac dysfunction  M53 3        Start Time: 0800  Stop Time: 0900  Total time in clinic (min): 60 minutes    Assessment  Assessment details: Larissa Florian has attended 12  visits since initiating PT and has demonstrated overall improvement  Mobility and strength has improved with decreased reports of pain  Larissa Florian has demonstrated an improvement in function as well  Currently, she has made steady progress towards her goals, but continue to remain limited with prolonged excess activity, prolonged sitting  SIJ has been remaining stabilized  Most limitation due to L sided hip/buttock tightness  At this time, continued physical therapy is recommended in order to address their remaining impairments in effort to further improve function  Impairments: abnormal gait, abnormal muscle tone, abnormal or restricted ROM, abnormal movement, activity intolerance, impaired physical strength, lacks appropriate home exercise program, weight-bearing intolerance and poor posture   Understanding of Dx/Px/POC: good   Prognosis: good    Goals  Short Term Goals:   1  Pain decreased 2 ratings in 4 weeks MET  2   ROM increased 10* in 4 weeks MET  3  Strength increased 1/2 grade in 4 weeks MET    Long Term Goals:   1  ADLS/IADLS in related activities improved to maximal level in 8 weeks PARTIALLY MET  2  Work performance improved to maximal level in 8 weeks PARTIALLY MET  3  Recreational activities are improved to maximal level in 8 weeks  PARTIALLY MET  4  [place] with HEP in 8 weeks   MET    Plan  Patient would benefit from: skilled physical therapy  Planned modality interventions: cryotherapy  Planned therapy interventions: abdominal trunk stabilization, therapeutic exercise, stretching, strengthening, patient education, neuromuscular re-education, manual therapy, functional ROM exercises, flexibility and home exercise program  Frequency: 2x week  Duration in weeks: 8  Treatment plan discussed with: patient        Subjective Evaluation    History of Present Illness  Mechanism of injury: Pt does report gradual improvement  Left sided hip/buttock discomfort persisting w/ prolonged sitting, increased activity or overuse  She has not yet resumed biking  Typically c/o increased pain and soreness w/ yard work  Neck pain significantly improved  Mild intermittent tightness only that occurs at times depending on sleep position  Neck pain at worst 2/10  Pain  At best pain ratin  At worst pain ratin  Location: neck    Patient Goals  Patient goals for therapy: decreased pain, independence with ADLs/IADLs, increased strength, increased motion and return to sport/leisure activities          Objective     Palpation     Additional Palpation Details  Decreased tightness over cervical musculature; mild restriction intermittently in R sub-occipital and 1st rib  Restrictions decreasing but persist over L glute med and hip flexor; intermittent QL tightness          Active Range of Motion   Cervical/Thoracic Spine       Cervical    Flexion: 42 degrees   Extension: 40 degrees      Left lateral flexion: 20 degrees      Right lateral flexion: 25 degrees      Left rotation: 68 degrees  Right rotation: 70 degrees         Left Shoulder   Normal active range of motion    Right Shoulder   Normal active range of motion    Lumbar   Flexion: 65 degrees   Extension: 10 degrees     Strength/Myotome Testing     Left Shoulder     Planes of Motion   Flexion: 5   Abduction: 5   External rotation at 90°: 5   Internal rotation at 90°: 5     Right Shoulder     Planes of Motion   Flexion: 5 Abduction: 5   External rotation at 90°: 5   Internal rotation at 90°: 5     Left Hip   Planes of Motion   Flexion: 5  Extension: 4+  Abduction: 4+    Right Hip   Planes of Motion   Flexion: 5  Extension: 5  Abduction: 5    Tests     Left Hip   Negative long sit         Flowsheet Rows    Flowsheet Row Most Recent Value   PT/OT G-Codes    Current Score 63/71   Projected Score 68/71   FOTO information reviewed Yes   Assessment Type Re-evaluation   G code set Other PT/OT Primary             Precautions: none      Manuals 5/31            Scissor MET L ant torsion np            Graston L glute med, piriformis 15 min            TPR R sub-occipitals 10 min            sidelying L QL stretch 2 min            Neuro Re-Ed 5/31                         bridges 5"x20            clamshells 5"x20            Prone hip ext x20                                                                                          TherEx 5/31                                      Piriformis stretch 30"x3            Hamstring stretch 30"x3                         Ther Activity                                       Gait Training                                       Modalities

## 2022-05-31 NOTE — LETTER
May 31, 2022    Woody Pak, [address] [address]    Patient: Yunior Landis   YOB: 1975   Date of Visit: 2022     Encounter Diagnosis     ICD-10-CM    1  Cervical segment dysfunction  M99 01    2  Thoracic segment dysfunction  M99 02    3  Hip pain, left  M25 552    4  Cervicalgia  M54 2    5  Sacroiliac dysfunction  M53 3        Dear Dr Loy Mullen:    Thank you for your recent referral of Yunior Landis  Please review the attached evaluation summary from Salima's recent visit  Please verify that you agree with the plan of care by signing the attached order  If you have any questions or concerns, please do not hesitate to call  I sincerely appreciate the opportunity to share in the care of one of your patients and hope to have another opportunity to work with you in the near future  Sincerely,    Aram Panda, PT      Referring Provider:      I certify that I have read the below Plan of Care and certify the need for these services furnished under this plan of treatment while under my care  Woody Pak, [address] [place] [id number]  Via Fax: [phone]          PT Re-Evaluation     Today's date: 2022  Patient name: Yunior Landis  : 1975  MRN: [id number]  Referring provider: Nabeel Neal DO  Dx:   Encounter Diagnosis     ICD-10-CM    1  Cervical segment dysfunction  M99 01    2  Thoracic segment dysfunction  M99 02    3  Hip pain, left  M25 552    4  Cervicalgia  M54 2    5  Sacroiliac dysfunction  M53 3        Start Time: 0800  Stop Time: 0900  Total time in clinic (min): 60 minutes    Assessment  Assessment details: Yunior Landis has attended 12  visits since initiating PT and has demonstrated overall improvement  Mobility and strength has improved with decreased reports of pain  Yunior Landis has demonstrated an improvement in function as well    Currently, she has made steady progress towards her goals, but continue to remain limited with prolonged excess activity, prolonged sitting  SIJ has been remaining stabilized  Most limitation due to L sided hip/buttock tightness  At this time, continued physical therapy is recommended in order to address their remaining impairments in effort to further improve function  Impairments: abnormal gait, abnormal muscle tone, abnormal or restricted ROM, abnormal movement, activity intolerance, impaired physical strength, lacks appropriate home exercise program, weight-bearing intolerance and poor posture   Understanding of Dx/Px/POC: good   Prognosis: good    Goals  Short Term Goals:   1  Pain decreased 2 ratings in 4 weeks MET  2   ROM increased 10* in 4 weeks MET  3  Strength increased 1/2 grade in 4 weeks MET    Long Term Goals:   1  ADLS/IADLS in related activities improved to maximal level in 8 weeks PARTIALLY MET  2  Work performance improved to maximal level in 8 weeks PARTIALLY MET  3  Recreational activities are improved to maximal level in 8 weeks  PARTIALLY MET  4  [place] with HEP in 8 weeks  MET    Plan  Patient would benefit from: skilled physical therapy  Planned modality interventions: cryotherapy  Planned therapy interventions: abdominal trunk stabilization, therapeutic exercise, stretching, strengthening, patient education, neuromuscular re-education, manual therapy, functional ROM exercises, flexibility and home exercise program  Frequency: 2x week  Duration in weeks: 8  Treatment plan discussed with: patient        Subjective Evaluation    History of Present Illness  Mechanism of injury: Pt does report gradual improvement  Left sided hip/buttock discomfort persisting w/ prolonged sitting, increased activity or overuse  She has not yet resumed biking  Typically c/o increased pain and soreness w/ yard work  Neck pain significantly improved    Mild intermittent tightness only that occurs at times depending on sleep position  Neck pain at worst 2/10  Pain  At best pain ratin  At worst pain ratin  Location: neck    Patient Goals  Patient goals for therapy: decreased pain, independence with ADLs/IADLs, increased strength, increased motion and return to sport/leisure activities          Objective     Palpation     Additional Palpation Details  Decreased tightness over cervical musculature; mild restriction intermittently in R sub-occipital and 1st rib  Restrictions decreasing but persist over L glute med and hip flexor; intermittent QL tightness  Active Range of Motion   Cervical/Thoracic Spine       Cervical    Flexion: 42 degrees   Extension: 40 degrees      Left lateral flexion: 20 degrees      Right lateral flexion: 25 degrees      Left rotation: 68 degrees  Right rotation: 70 degrees         Left Shoulder   Normal active range of motion    Right Shoulder   Normal active range of motion    Lumbar   Flexion: 65 degrees   Extension: 10 degrees     Strength/Myotome Testing     Left Shoulder     Planes of Motion   Flexion: 5   Abduction: 5   External rotation at 90°: 5   Internal rotation at 90°: 5     Right Shoulder     Planes of Motion   Flexion: 5   Abduction: 5   External rotation at 90°: 5   Internal rotation at 90°: 5     Left Hip   Planes of Motion   Flexion: 5  Extension: 4+  Abduction: 4+    Right Hip   Planes of Motion   Flexion: 5  Extension: 5  Abduction: 5    Tests     Left Hip   Negative long sit         Flowsheet Rows    Flowsheet Row Most Recent Value   PT/OT G-Codes    Current Score 63/71   Projected Score 68/71   FOTO information reviewed Yes   Assessment Type Re-evaluation   G code set Other PT/OT Primary             Precautions: none      Manuals             Scissor MET L ant torsion np            Graston L glute med, piriformis 15 min            TPR R sub-occipitals 10 min            sidelying L QL stretch 2 min            Neuro Re-Ed                          bridges 5"x20 clamshells 5"x20            Prone hip ext x20                                                                                          TherEx 5/31                                      Piriformis stretch 30"x3            Hamstring stretch 30"x3                         Ther Activity                                       Gait Training                                       Modalities

## 2022-06-03 ENCOUNTER — OFFICE VISIT (OUTPATIENT)
Dept: PHYSICAL THERAPY | Facility: MEDICAL CENTER | Age: 47
End: 2022-06-03
Payer: COMMERCIAL

## 2022-06-03 DIAGNOSIS — M99.01 CERVICAL SEGMENT DYSFUNCTION: Primary | ICD-10-CM

## 2022-06-03 DIAGNOSIS — M25.552 HIP PAIN, LEFT: ICD-10-CM

## 2022-06-03 DIAGNOSIS — M99.02 THORACIC SEGMENT DYSFUNCTION: ICD-10-CM

## 2022-06-03 DIAGNOSIS — M53.3 SACROILIAC DYSFUNCTION: ICD-10-CM

## 2022-06-03 DIAGNOSIS — M54.2 CERVICALGIA: ICD-10-CM

## 2022-06-03 PROCEDURE — 97112 NEUROMUSCULAR REEDUCATION: CPT | Performed by: PHYSICAL THERAPIST

## 2022-06-03 PROCEDURE — 97140 MANUAL THERAPY 1/> REGIONS: CPT | Performed by: PHYSICAL THERAPIST

## 2022-06-03 NOTE — PROGRESS NOTES
Daily Note     Today's date: 6/3/2022  Patient name: Eligio Gould  : 1975  MRN: [id number]  Referring provider: Candelaria Caballero DO  Dx:   Encounter Diagnosis     ICD-10-CM    1  Cervical segment dysfunction  M99 01    2  Thoracic segment dysfunction  M99 02    3  Hip pain, left  M25 552    4  Cervicalgia  M54 2    5  Sacroiliac dysfunction  M53 3                   Subjective: Pt reports feeling better following last visit  Mild tightness only  Objective: See treatment diary below      Assessment: Tolerated treatment well  Patient demonstrated fatigue post treatment, exhibited good technique with therapeutic exercises and would benefit from continued PT  Restrictions do continue to decrease  Plan: Continue per plan of care        Precautions: none      Manuals 5/31 6/3           Scissor MET L ant torsion np np           Graston L glute med, piriformis 15 min 15 min           TPR R sub-occipitals 10 min 10 min           sidelying L QL stretch 2 min 2 min           Neuro Re-Ed 5/31 6/3                        bridges 5"x20 5"x20           clamshells 5"x20 5"x20           Prone hip ext x20 x20                                                                                         TherEx 5/31 6/3                                     Piriformis stretch 30"x3 30"x3           Hamstring stretch 30"x3 30"x3                        Ther Activity                                       Gait Training                                       Modalities

## 2022-06-06 ENCOUNTER — OFFICE VISIT (OUTPATIENT)
Dept: PHYSICAL THERAPY | Facility: MEDICAL CENTER | Age: 47
End: 2022-06-06
Payer: COMMERCIAL

## 2022-06-06 DIAGNOSIS — M54.2 CERVICALGIA: ICD-10-CM

## 2022-06-06 DIAGNOSIS — M99.01 CERVICAL SEGMENT DYSFUNCTION: Primary | ICD-10-CM

## 2022-06-06 DIAGNOSIS — M25.552 HIP PAIN, LEFT: ICD-10-CM

## 2022-06-06 DIAGNOSIS — M99.02 THORACIC SEGMENT DYSFUNCTION: ICD-10-CM

## 2022-06-06 DIAGNOSIS — M53.3 SACROILIAC DYSFUNCTION: ICD-10-CM

## 2022-06-06 PROCEDURE — 97140 MANUAL THERAPY 1/> REGIONS: CPT | Performed by: PHYSICAL THERAPIST

## 2022-06-06 PROCEDURE — 97112 NEUROMUSCULAR REEDUCATION: CPT | Performed by: PHYSICAL THERAPIST

## 2022-06-06 NOTE — PROGRESS NOTES
Daily Note     Today's date: 2022  Patient name: Terry Esteban  : 1975  MRN: [id number]  Referring provider: Christopher Roberto DO  Dx:   Encounter Diagnosis     ICD-10-CM    1  Cervical segment dysfunction  M99 01    2  Thoracic segment dysfunction  M99 02    3  Hip pain, left  M25 552    4  Cervicalgia  M54 2    5  Sacroiliac dysfunction  M53 3                   Subjective: Pt reporting L sided tightness only today in hip/buttock region; no significant reports of pain  Objective: See treatment diary below      Assessment: Tolerated treatment well  Patient demonstrated fatigue post treatment, exhibited good technique with therapeutic exercises and would benefit from continued PT  Restrictions do continue to decrease  Plan: Continue per plan of care        Precautions: none      Manuals 5/31 6/3 6/6          Scissor MET L ant torsion np np           Graston L glute med, piriformis 15 min 15 min 15 min          TPR R sub-occipitals 10 min 10 min 10 min          sidelying L QL stretch 2 min 2 min 2 min          Neuro Re-Ed 5/31 6/3 6/6                       bridges 5"x20 5"x20 5"x20          clamshells 5"x20 5"x20 5"x20          Prone hip ext x20 x20 x20                                                                                        TherEx 5/31 6/3 6/6                                    Piriformis stretch 30"x3 30"x3 30"x3          Hamstring stretch 30"x3 30"x3 30"x3                       Ther Activity                                       Gait Training                                       Modalities

## 2022-06-10 ENCOUNTER — OFFICE VISIT (OUTPATIENT)
Dept: PHYSICAL THERAPY | Facility: MEDICAL CENTER | Age: 47
End: 2022-06-10
Payer: COMMERCIAL

## 2022-06-10 DIAGNOSIS — M53.3 SACROILIAC DYSFUNCTION: ICD-10-CM

## 2022-06-10 DIAGNOSIS — M99.01 CERVICAL SEGMENT DYSFUNCTION: Primary | ICD-10-CM

## 2022-06-10 DIAGNOSIS — M99.02 THORACIC SEGMENT DYSFUNCTION: ICD-10-CM

## 2022-06-10 DIAGNOSIS — M25.552 HIP PAIN, LEFT: ICD-10-CM

## 2022-06-10 DIAGNOSIS — M54.2 CERVICALGIA: ICD-10-CM

## 2022-06-10 PROCEDURE — 97112 NEUROMUSCULAR REEDUCATION: CPT | Performed by: PHYSICAL THERAPIST

## 2022-06-10 PROCEDURE — 97110 THERAPEUTIC EXERCISES: CPT | Performed by: PHYSICAL THERAPIST

## 2022-06-10 PROCEDURE — 97140 MANUAL THERAPY 1/> REGIONS: CPT | Performed by: PHYSICAL THERAPIST

## 2022-06-10 NOTE — PROGRESS NOTES
Daily Note     Today's date: 6/10/2022  Patient name: Elio Jaquez  : 1975  MRN: [id number]  Referring provider: Aliya Escobedo DO  Dx:   Encounter Diagnosis     ICD-10-CM    1  Cervical segment dysfunction  M99 01    2  Thoracic segment dysfunction  M99 02    3  Hip pain, left  M25 552    4  Cervicalgia  M54 2    5  Sacroiliac dysfunction  M53 3                   Subjective: Pt reports some persisting tightness in hip/buttock  Minimal to no neck pain  Objective: See treatment diary below      Assessment: Tolerated treatment well  Patient demonstrated fatigue post treatment, exhibited good technique with therapeutic exercises and would benefit from continued PT  Mild restriction only in glute med  Plan: Continue per plan of care        Precautions: none      Manuals 5/31 6/3 6/6 6/10         Scissor MET L ant torsion np np           Graston L glute med, piriformis 15 min 15 min 15 min 15 min         TPR R sub-occipitals 10 min 10 min 10 min 10 min         sidelying L QL stretch 2 min 2 min 2 min 2 min         Neuro Re-Ed 5/31 6/3 6/6 6/10                      bridges 5"x20 5"x20 5"x20 5"x20         clamshells 5"x20 5"x20 5"x20 5"x20         Prone hip ext x20 x20 x20 x20                                                                                       TherEx 5/31 6/3 6/6 6/10                                   Piriformis stretch 30"x3 30"x3 30"x3 30"x3         Hamstring stretch 30"x3 30"x3 30"x3 30"x3                      Ther Activity                                       Gait Training                                       Modalities

## 2022-06-13 ENCOUNTER — OFFICE VISIT (OUTPATIENT)
Dept: PHYSICAL THERAPY | Facility: MEDICAL CENTER | Age: 47
End: 2022-06-13
Payer: COMMERCIAL

## 2022-06-13 DIAGNOSIS — M54.2 CERVICALGIA: ICD-10-CM

## 2022-06-13 DIAGNOSIS — M99.01 CERVICAL SEGMENT DYSFUNCTION: Primary | ICD-10-CM

## 2022-06-13 DIAGNOSIS — M53.3 SACROILIAC DYSFUNCTION: ICD-10-CM

## 2022-06-13 DIAGNOSIS — M25.552 HIP PAIN, LEFT: ICD-10-CM

## 2022-06-13 DIAGNOSIS — M99.02 THORACIC SEGMENT DYSFUNCTION: ICD-10-CM

## 2022-06-13 PROCEDURE — 97110 THERAPEUTIC EXERCISES: CPT | Performed by: PHYSICAL THERAPIST

## 2022-06-13 PROCEDURE — 97140 MANUAL THERAPY 1/> REGIONS: CPT | Performed by: PHYSICAL THERAPIST

## 2022-06-13 PROCEDURE — 97112 NEUROMUSCULAR REEDUCATION: CPT | Performed by: PHYSICAL THERAPIST

## 2022-06-13 NOTE — PROGRESS NOTES
Daily Note     Today's date: 2022  Patient name: Rebekah Harrell  : 1975  MRN: [id number]  Referring provider: Amanda Villavicencio DO  Dx:   Encounter Diagnosis     ICD-10-CM    1  Cervical segment dysfunction  M99 01    2  Thoracic segment dysfunction  M99 02    3  Hip pain, left  M25 552    4  Cervicalgia  M54 2    5  Sacroiliac dysfunction  M53 3                   Subjective: Pt reports having headache over the weekend  She has been having low back/hip soreness in the mornings when getting out of bed that does decrease as the day progresses  No longer feeling debilitated from pain  Objective: See treatment diary below      Assessment: Tolerated treatment well  Patient demonstrated fatigue post treatment, exhibited good technique with therapeutic exercises and would benefit from continued PT  Most tightness noted in piriformis today  Sig tightness over R sub-occipital        Plan: Continue per plan of care        Precautions: none      Manuals 5/31 6/3 6/6 6/10 6/13        Scissor MET L ant torsion np np           Graston L glute med, piriformis 15 min 15 min 15 min 15 min 15 min        TPR R sub-occipitals 10 min 10 min 10 min 10 min 10 min        sidelying L QL stretch 2 min 2 min 2 min 2 min 2 min        Neuro Re-Ed 5/31 6/3 6/6 6/10 6/13                     bridges 5"x20 5"x20 5"x20 5"x20 5"X20        clamshells 5"x20 5"x20 5"x20 5"x20 5"x20        Prone hip ext x20 x20 x20 x20 x20                                                                                      TherEx 5/31 6/3 6/6 6/10 6/13                                  Piriformis stretch 30"x3 30"x3 30"x3 30"x3 30"x3        Hamstring stretch 30"x3 30"x3 30"x3 30"x3 30"x3                     Ther Activity                                       Gait Training                                       Modalities

## 2022-06-16 ENCOUNTER — OFFICE VISIT (OUTPATIENT)
Dept: PHYSICAL THERAPY | Facility: MEDICAL CENTER | Age: 47
End: 2022-06-16
Payer: COMMERCIAL

## 2022-06-16 DIAGNOSIS — M25.552 HIP PAIN, LEFT: ICD-10-CM

## 2022-06-16 DIAGNOSIS — M99.02 THORACIC SEGMENT DYSFUNCTION: ICD-10-CM

## 2022-06-16 DIAGNOSIS — M54.2 CERVICALGIA: ICD-10-CM

## 2022-06-16 DIAGNOSIS — M99.01 CERVICAL SEGMENT DYSFUNCTION: Primary | ICD-10-CM

## 2022-06-16 DIAGNOSIS — M53.3 SACROILIAC DYSFUNCTION: ICD-10-CM

## 2022-06-16 PROCEDURE — 97112 NEUROMUSCULAR REEDUCATION: CPT | Performed by: PHYSICAL THERAPIST

## 2022-06-16 PROCEDURE — 97140 MANUAL THERAPY 1/> REGIONS: CPT | Performed by: PHYSICAL THERAPIST

## 2022-06-16 NOTE — PROGRESS NOTES
Daily Note     Today's date: 2022  Patient name: Dunia Gao  : 1975  MRN: [id number]  Referring provider: Nathan Love DO  Dx:   Encounter Diagnosis     ICD-10-CM    1  Cervical segment dysfunction  M99 01    2  Thoracic segment dysfunction  M99 02    3  Hip pain, left  M25 552    4  Cervicalgia  M54 2    5  Sacroiliac dysfunction  M53 3                   Subjective: Pt reports tightness only today; no reports of pain  Objective: See treatment diary below      Assessment: Tolerated treatment well  Patient demonstrated fatigue post treatment, exhibited good technique with therapeutic exercises and would benefit from continued PT  Decreased restrictions over piriformis  Plan: Continue per plan of care        Precautions: none      Manuals 5/31 6/3 6/6 6/10 6/13 6/16       Scissor MET L ant torsion np np           Graston L glute med, piriformis 15 min 15 min 15 min 15 min 15 min 15 min       TPR R sub-occipitals 10 min 10 min 10 min 10 min 10 min 10 min       sidelying L QL stretch 2 min 2 min 2 min 2 min 2 min 2 min       Neuro Re-Ed 5/31 6/3 6/6 6/10 6/13 6/16                    bridges 5"x20 5"x20 5"x20 5"x20 5"X20 5"x20       clamshells 5"x20 5"x20 5"x20 5"x20 5"x20 5"x20       Prone hip ext x20 x20 x20 x20 x20 x20                                                                                     TherEx 5/31 6/3 6/6 6/10 6/13 6/16                                 Piriformis stretch 30"x3 30"x3 30"x3 30"x3 30"x3 30"x3       Hamstring stretch 30"x3 30"x3 30"x3 30"x3 30"x3 30"x3                    Ther Activity                                       Gait Training                                       Modalities

## 2022-06-21 ENCOUNTER — OFFICE VISIT (OUTPATIENT)
Dept: PHYSICAL THERAPY | Facility: MEDICAL CENTER | Age: 47
End: 2022-06-21
Payer: COMMERCIAL

## 2022-06-21 DIAGNOSIS — M54.2 CERVICALGIA: ICD-10-CM

## 2022-06-21 DIAGNOSIS — M25.552 HIP PAIN, LEFT: ICD-10-CM

## 2022-06-21 DIAGNOSIS — M99.01 CERVICAL SEGMENT DYSFUNCTION: Primary | ICD-10-CM

## 2022-06-21 DIAGNOSIS — M53.3 SACROILIAC DYSFUNCTION: ICD-10-CM

## 2022-06-21 DIAGNOSIS — M99.02 THORACIC SEGMENT DYSFUNCTION: ICD-10-CM

## 2022-06-21 PROCEDURE — 97112 NEUROMUSCULAR REEDUCATION: CPT | Performed by: PHYSICAL THERAPIST

## 2022-06-21 PROCEDURE — 97140 MANUAL THERAPY 1/> REGIONS: CPT | Performed by: PHYSICAL THERAPIST

## 2022-06-21 NOTE — PROGRESS NOTES
Daily Note     Today's date: 2022  Patient name: Radha Ballard  : 1975  MRN: [id number]  Referring provider: Cuca Haq DO  Dx:   Encounter Diagnosis     ICD-10-CM    1  Cervical segment dysfunction  M99 01    2  Thoracic segment dysfunction  M99 02    3  Hip pain, left  M25 552    4  Cervicalgia  M54 2    5  Sacroiliac dysfunction  M53 3                   Subjective: Pt reports tightness in hip; no reports of pain  She has had some increase in left sided neck pain the past few days w/ dull headache  Objective: See treatment diary below      Assessment: Tolerated treatment well  Patient demonstrated fatigue post treatment, exhibited good technique with therapeutic exercises and would benefit from continued PT  Tightness noted over L sub-occipitals and 1st rib  Restrictions continue to decrease over L hip musculature  Relief of tightness and headache post treatment  Plan: Continue per plan of care        Precautions: none      Manuals 5/31 6/3 6/6 6/10 6/13 6/16 6/21      Scissor MET L ant torsion np np           Graston L glute med, piriformis 15 min 15 min 15 min 15 min 15 min 15 min 15 min      TPR R sub-occipitals 10 min 10 min 10 min 10 min 10 min 10 min 10 min w/ L      sidelying L QL stretch 2 min 2 min 2 min 2 min 2 min 2 min 2 min      Neuro Re-Ed 5/31 6/3 6/6 6/10 6/13 6/16 6/21                   bridges 5"x20 5"x20 5"x20 5"x20 5"X20 5"x20 5"x20      clamshells 5"x20 5"x20 5"x20 5"x20 5"x20 5"x20 5"x20      Prone hip ext x20 x20 x20 x20 x20 x20 x20                                                                                    TherEx 5/31 6/3 6/6 6/10 6/13 6/16 6/21                                Piriformis stretch 30"x3 30"x3 30"x3 30"x3 30"x3 30"x3 30"x3      Hamstring stretch 30"x3 30"x3 30"x3 30"x3 30"x3 30"x3 30"x3                   Ther Activity                                       Gait Training                                       Modalities

## 2022-06-24 ENCOUNTER — OFFICE VISIT (OUTPATIENT)
Dept: PHYSICAL THERAPY | Facility: MEDICAL CENTER | Age: 47
End: 2022-06-24
Payer: COMMERCIAL

## 2022-06-24 DIAGNOSIS — M99.02 THORACIC SEGMENT DYSFUNCTION: ICD-10-CM

## 2022-06-24 DIAGNOSIS — M54.2 CERVICALGIA: ICD-10-CM

## 2022-06-24 DIAGNOSIS — M53.3 SACROILIAC DYSFUNCTION: ICD-10-CM

## 2022-06-24 DIAGNOSIS — M99.01 CERVICAL SEGMENT DYSFUNCTION: Primary | ICD-10-CM

## 2022-06-24 DIAGNOSIS — M25.552 HIP PAIN, LEFT: ICD-10-CM

## 2022-06-24 PROCEDURE — 97140 MANUAL THERAPY 1/> REGIONS: CPT | Performed by: PHYSICAL THERAPIST

## 2022-06-24 PROCEDURE — 97112 NEUROMUSCULAR REEDUCATION: CPT | Performed by: PHYSICAL THERAPIST

## 2022-06-24 NOTE — PROGRESS NOTES
Daily Note     Today's date: 2022  Patient name: Jyoti Michelle  : 1975  MRN: [id number]  Referring provider: Debbie Jauregui DO  Dx:   Encounter Diagnosis     ICD-10-CM    1  Cervical segment dysfunction  M99 01    2  Thoracic segment dysfunction  M99 02    3  Hip pain, left  M25 552    4  Cervicalgia  M54 2    5  Sacroiliac dysfunction  M53 3                   Subjective: Pt reports relief following last visit  She was able to clean house w/out exacerbation of pain for 2 days  She does report increase in anterior hip pain and tightness yesterday for unknown reason; possibly weather  Objective: See treatment diary below      Assessment: Tolerated treatment well  Patient demonstrated fatigue post treatment, exhibited good technique with therapeutic exercises and would benefit from continued PT  Most tightness noted today in piriformis as well as L sub-occipitals  Plan: Continue per plan of care        Precautions: none      Manuals 5/31 6/3 6/6 6/10 6/13 6/16 6/21 6/24     Scissor MET L ant torsion np np           Graston L glute med, piriformis 15 min 15 min 15 min 15 min 15 min 15 min 15 min 15 min     TPR R sub-occipitals 10 min 10 min 10 min 10 min 10 min 10 min 10 min w/ L 10 min     sidelying L QL stretch 2 min 2 min 2 min 2 min 2 min 2 min 2 min 2 min     Neuro Re-Ed 5/31 6/3 6/6 6/10 6/13 6/16 6/21 6/24                  bridges 5"x20 5"x20 5"x20 5"x20 5"X20 5"x20 5"x20 5"x20     clamshells 5"x20 5"x20 5"x20 5"x20 5"x20 5"x20 5"x20 5"x20     Prone hip ext x20 x20 x20 x20 x20 x20 x20 x20                                                                                   TherEx 5/31 6/3 6/6 6/10 6/13 6/16 6/21 6/24                               Piriformis stretch 30"x3 30"x3 30"x3 30"x3 30"x3 30"x3 30"x3 30"x3     Hamstring stretch 30"x3 30"x3 30"x3 30"x3 30"x3 30"x3 30"x3 30"x3                  Ther Activity                                       Gait Training Modalities

## 2022-06-27 ENCOUNTER — OFFICE VISIT (OUTPATIENT)
Dept: PHYSICAL THERAPY | Facility: MEDICAL CENTER | Age: 47
End: 2022-06-27
Payer: COMMERCIAL

## 2022-06-27 DIAGNOSIS — M99.02 THORACIC SEGMENT DYSFUNCTION: ICD-10-CM

## 2022-06-27 DIAGNOSIS — M53.3 SACROILIAC DYSFUNCTION: ICD-10-CM

## 2022-06-27 DIAGNOSIS — M54.2 CERVICALGIA: ICD-10-CM

## 2022-06-27 DIAGNOSIS — M25.552 HIP PAIN, LEFT: ICD-10-CM

## 2022-06-27 DIAGNOSIS — M99.01 CERVICAL SEGMENT DYSFUNCTION: Primary | ICD-10-CM

## 2022-06-27 PROCEDURE — 97140 MANUAL THERAPY 1/> REGIONS: CPT | Performed by: PHYSICAL THERAPIST

## 2022-06-27 PROCEDURE — 97112 NEUROMUSCULAR REEDUCATION: CPT | Performed by: PHYSICAL THERAPIST

## 2022-06-27 PROCEDURE — 97110 THERAPEUTIC EXERCISES: CPT | Performed by: PHYSICAL THERAPIST

## 2022-06-27 NOTE — PROGRESS NOTES
Daily Note     Today's date: 2022  Patient name: Marylu Nice  : 1975  MRN: [id number]  Referring provider: Kavin Mishra DO  Dx:   Encounter Diagnosis     ICD-10-CM    1  Cervical segment dysfunction  M99 01    2  Thoracic segment dysfunction  M99 02    3  Hip pain, left  M25 552    4  Cervicalgia  M54 2    5  Sacroiliac dysfunction  M53 3                   Subjective: Pt reports feeling good since last visit; no issues over the weekend  Objective: See treatment diary below      Assessment: Tolerated treatment well  Patient demonstrated fatigue post treatment, exhibited good technique with therapeutic exercises and would benefit from continued PT  Decrease in tightness over hip musculature today  Most tightness noted in L sub-occipital region  Plan: Continue per plan of care        Precautions: none      Manuals 5/31 6/3 6/6 6/10 6/13 6/16 6/21 6/24 6/27    Scissor MET L ant torsion np np           Graston L glute med, piriformis 15 min 15 min 15 min 15 min 15 min 15 min 15 min 15 min 15 min    TPR R sub-occipitals 10 min 10 min 10 min 10 min 10 min 10 min 10 min w/ L 10 min 10 min    sidelying L QL stretch 2 min 2 min 2 min 2 min 2 min 2 min 2 min 2 min 2 min    Neuro Re-Ed 5/31 6/3 6/6 6/10 6/13 6/16 6/21 6/24 6/27                 bridges 5"x20 5"x20 5"x20 5"x20 5"X20 5"x20 5"x20 5"x20 5"x20    clamshells 5"x20 5"x20 5"x20 5"x20 5"x20 5"x20 5"x20 5"x20 5"X20    Prone hip ext x20 x20 x20 x20 x20 x20 x20 x20 x20                                                                                  TherEx 5/31 6/3 6/6 6/10 6/13 6/16 6/21 6/24 6/27                              Piriformis stretch 30"x3 30"x3 30"x3 30"x3 30"x3 30"x3 30"x3 30"x3 30"x3    Hamstring stretch 30"x3 30"x3 30"x3 30"x3 30"x3 30"x3 30"x3 30"x3 30"x3                 Ther Activity                                       Gait Training                                       Modalities

## 2022-06-30 ENCOUNTER — OFFICE VISIT (OUTPATIENT)
Dept: PHYSICAL THERAPY | Facility: MEDICAL CENTER | Age: 47
End: 2022-06-30
Payer: COMMERCIAL

## 2022-06-30 DIAGNOSIS — M99.01 CERVICAL SEGMENT DYSFUNCTION: Primary | ICD-10-CM

## 2022-06-30 DIAGNOSIS — M25.552 HIP PAIN, LEFT: ICD-10-CM

## 2022-06-30 DIAGNOSIS — M99.02 THORACIC SEGMENT DYSFUNCTION: ICD-10-CM

## 2022-06-30 DIAGNOSIS — M53.3 SACROILIAC DYSFUNCTION: ICD-10-CM

## 2022-06-30 DIAGNOSIS — M54.2 CERVICALGIA: ICD-10-CM

## 2022-06-30 PROCEDURE — 97140 MANUAL THERAPY 1/> REGIONS: CPT | Performed by: PHYSICAL THERAPIST

## 2022-06-30 NOTE — PROGRESS NOTES
Daily Note     Today's date: 2022  Patient name: Jyoti Michelle  : 1975  MRN: [id number]  Referring provider: Debbie Jauregui DO  Dx:   Encounter Diagnosis     ICD-10-CM    1  Cervical segment dysfunction  M99 01    2  Thoracic segment dysfunction  M99 02    3  Hip pain, left  M25 552    4  Cervicalgia  M54 2    5  Sacroiliac dysfunction  M53 3                   Subjective: Pt reports continued improvement  Objective: See treatment diary below      Assessment: Tolerated treatment well  Patient demonstrated fatigue post treatment, exhibited good technique with therapeutic exercises and would benefit from continued PT  Restrictions continue to decrease  Most persisting over L sub-occipitals  Will perform exercises independently  Plan: Continue per plan of care        Precautions: none      Manuals 5/31 6/3 6/6 6/10 6/13 6/16 6/21 6/24 6/27 6/30   Scissor MET L ant torsion np np           Graston L glute med, piriformis 15 min 15 min 15 min 15 min 15 min 15 min 15 min 15 min 15 min 15 min   TPR R sub-occipitals 10 min 10 min 10 min 10 min 10 min 10 min 10 min w/ L 10 min 10 min 10 min   sidelying L QL stretch 2 min 2 min 2 min 2 min 2 min 2 min 2 min 2 min 2 min 2 min   Neuro Re-Ed 5/31 6/3 6/6 6/10 6/13 6/16 6/21 6/24 6/27                 bridges 5"x20 5"x20 5"x20 5"x20 5"X20 5"x20 5"x20 5"x20 5"x20    clamshells 5"x20 5"x20 5"x20 5"x20 5"x20 5"x20 5"x20 5"x20 5"X20    Prone hip ext x20 x20 x20 x20 x20 x20 x20 x20 x20                                                                                  TherEx 5/31 6/3 6/6 6/10 6/13 6/16 6/21 6/24 6/27                              Piriformis stretch 30"x3 30"x3 30"x3 30"x3 30"x3 30"x3 30"x3 30"x3 30"x3    Hamstring stretch 30"x3 30"x3 30"x3 30"x3 30"x3 30"x3 30"x3 30"x3 30"x3                 Ther Activity                                       Gait Training                                       Modalities

## 2022-07-07 ENCOUNTER — OFFICE VISIT (OUTPATIENT)
Dept: PHYSICAL THERAPY | Facility: MEDICAL CENTER | Age: 47
End: 2022-07-07
Payer: COMMERCIAL

## 2022-07-07 DIAGNOSIS — M25.552 HIP PAIN, LEFT: ICD-10-CM

## 2022-07-07 DIAGNOSIS — M53.3 SACROILIAC DYSFUNCTION: ICD-10-CM

## 2022-07-07 DIAGNOSIS — M99.02 THORACIC SEGMENT DYSFUNCTION: ICD-10-CM

## 2022-07-07 DIAGNOSIS — M99.01 CERVICAL SEGMENT DYSFUNCTION: Primary | ICD-10-CM

## 2022-07-07 DIAGNOSIS — M54.2 CERVICALGIA: ICD-10-CM

## 2022-07-07 PROCEDURE — 97112 NEUROMUSCULAR REEDUCATION: CPT | Performed by: PHYSICAL THERAPIST

## 2022-07-07 PROCEDURE — 97140 MANUAL THERAPY 1/> REGIONS: CPT | Performed by: PHYSICAL THERAPIST

## 2022-07-07 NOTE — PROGRESS NOTES
Daily Note     Today's date: 2022  Patient name: Tarah Cristina  : 1975  MRN: [id number]  Referring provider: Holly Hays DO  Dx:   Encounter Diagnosis     ICD-10-CM    1  Cervical segment dysfunction  M99 01    2  Thoracic segment dysfunction  M99 02    3  Hip pain, left  M25 552    4  Cervicalgia  M54 2    5  Sacroiliac dysfunction  M53 3                   Subjective: Pt reports soreness after prolonged car riding over the weekend  Pain w/ 9 hour drive back home  Increased neck tightness most likely due to increased stress      Objective: See treatment diary below      Assessment: Tolerated treatment well  Patient demonstrated fatigue post treatment, exhibited good technique with therapeutic exercises and would benefit from continued PT  Most tightness noted over L sub-occipitals  Plan: Continue per plan of care        Precautions: none      Manuals    Scissor MET L ant torsion np np           Graston L glute med, piriformis 15 min         15 min   TPR R sub-occipitals 10 min         10 min   sidelying L QL stretch 2 min         2 min   Neuro Re-Ed                          bridges 5"x20            Clamshells 5"x20            Prone hip ext x20                                                                                          TherEx                               Piriformis stretch 30"x3        30"x3    Hamstring stretch 30"x3        30"x3                 Ther Activity                                       Gait Training                                       Modalities

## 2022-07-14 ENCOUNTER — OFFICE VISIT (OUTPATIENT)
Dept: PHYSICAL THERAPY | Facility: MEDICAL CENTER | Age: 47
End: 2022-07-14
Payer: COMMERCIAL

## 2022-07-14 DIAGNOSIS — M53.3 SACROILIAC DYSFUNCTION: ICD-10-CM

## 2022-07-14 DIAGNOSIS — M99.02 THORACIC SEGMENT DYSFUNCTION: ICD-10-CM

## 2022-07-14 DIAGNOSIS — M25.552 HIP PAIN, LEFT: ICD-10-CM

## 2022-07-14 DIAGNOSIS — M99.01 CERVICAL SEGMENT DYSFUNCTION: Primary | ICD-10-CM

## 2022-07-14 DIAGNOSIS — M54.2 CERVICALGIA: ICD-10-CM

## 2022-07-14 PROCEDURE — 97140 MANUAL THERAPY 1/> REGIONS: CPT | Performed by: PHYSICAL THERAPIST

## 2022-07-14 NOTE — PROGRESS NOTES
Daily Note     Today's date: 2022  Patient name: Adam Chavez  : 1975  MRN: [id number]  Referring provider: Eyal Choi DO  Dx:   Encounter Diagnosis     ICD-10-CM    1  Cervical segment dysfunction  M99 01    2  Thoracic segment dysfunction  M99 02    3  Hip pain, left  M25 552    4  Cervicalgia  M54 2    5  Sacroiliac dysfunction  M53 3                   Subjective: Pt reports feeling better compared to last week  Intermittent muscle tightness persisting  Objective: See treatment diary below      Assessment: Tolerated treatment well  Patient demonstrated fatigue post treatment, exhibited good technique with therapeutic exercises and would benefit from continued PT  Cervical tightness decreased   Most restricted over piriformis  Plan: Continue per plan of care        Precautions: none      Manuals    Scissor MET L ant torsion np np           Graston L glute med, piriformis 15 min 20 min        15 min   TPR R sub-occipitals 10 min 10 mn        10 min   sidelying L QL stretch 2 min 2 min        2 min   Neuro Re-Ed                          bridges 5"x20            Clamshells 5"x20            Prone hip ext x20                                                                                          TherEx                               Piriformis stretch 30"x3        30"x3    Hamstring stretch 30"x3        30"x3                 Ther Activity                                       Gait Training                                       Modalities

## 2022-07-21 ENCOUNTER — OFFICE VISIT (OUTPATIENT)
Dept: PHYSICAL THERAPY | Facility: MEDICAL CENTER | Age: 47
End: 2022-07-21
Payer: COMMERCIAL

## 2022-07-21 DIAGNOSIS — M25.552 HIP PAIN, LEFT: ICD-10-CM

## 2022-07-21 DIAGNOSIS — M99.01 CERVICAL SEGMENT DYSFUNCTION: Primary | ICD-10-CM

## 2022-07-21 DIAGNOSIS — M99.02 THORACIC SEGMENT DYSFUNCTION: ICD-10-CM

## 2022-07-21 DIAGNOSIS — M53.3 SACROILIAC DYSFUNCTION: ICD-10-CM

## 2022-07-21 DIAGNOSIS — M54.2 CERVICALGIA: ICD-10-CM

## 2022-07-21 PROCEDURE — 97140 MANUAL THERAPY 1/> REGIONS: CPT | Performed by: PHYSICAL THERAPIST

## 2022-07-21 NOTE — PROGRESS NOTES
Daily Note     Today's date: 2022  Patient name: Krish Robles  : 1975  MRN: [id number]  Referring provider: Billy Blank DO  Dx:   Encounter Diagnosis     ICD-10-CM    1  Cervical segment dysfunction  M99 01    2  Thoracic segment dysfunction  M99 02    3  Hip pain, left  M25 552    4  Cervicalgia  M54 2    5  Sacroiliac dysfunction  M53 3                   Subjective:  Pt is doing well  Tightness has not been increasing  Objective: See treatment diary below      Assessment: Tolerated treatment well  Patient demonstrated fatigue post treatment, exhibited good technique with therapeutic exercises and would benefit from continued PT  Most tightness persisting over L piriformis and sub-occipitals  Plan: Continue per plan of care        Precautions: none      Manuals    Scissor MET L ant torsion np np           Graston L glute med, piriformis 15 min 20 min 20 min       15 min   TPR R sub-occipitals 10 min 10 mn 10 min       10 min   sidelying L QL stretch 2 min 2 min 2 min       2 min   Neuro Re-Ed                          bridges 5"x20            Clamshells 5"x20            Prone hip ext x20                                                                                          TherEx                               Piriformis stretch 30"x3        30"x3    Hamstring stretch 30"x3        30"x3                 Ther Activity                                       Gait Training                                       Modalities

## 2022-07-28 ENCOUNTER — APPOINTMENT (OUTPATIENT)
Dept: PHYSICAL THERAPY | Facility: MEDICAL CENTER | Age: 47
End: 2022-07-28
Payer: COMMERCIAL

## 2022-08-01 ENCOUNTER — OFFICE VISIT (OUTPATIENT)
Dept: PHYSICAL THERAPY | Facility: MEDICAL CENTER | Age: 47
End: 2022-08-01
Payer: COMMERCIAL

## 2022-08-01 DIAGNOSIS — M25.552 HIP PAIN, LEFT: ICD-10-CM

## 2022-08-01 DIAGNOSIS — M53.3 SACROILIAC DYSFUNCTION: ICD-10-CM

## 2022-08-01 DIAGNOSIS — M54.2 CERVICALGIA: ICD-10-CM

## 2022-08-01 DIAGNOSIS — M99.01 CERVICAL SEGMENT DYSFUNCTION: Primary | ICD-10-CM

## 2022-08-01 DIAGNOSIS — M99.02 THORACIC SEGMENT DYSFUNCTION: ICD-10-CM

## 2022-08-01 PROCEDURE — 97140 MANUAL THERAPY 1/> REGIONS: CPT | Performed by: PHYSICAL THERAPIST

## 2022-08-01 NOTE — PROGRESS NOTES
Daily Note     Today's date: 2022  Patient name: May Pemberton  : 1975  MRN: [id number]  Referring provider: Nithya Chambers DO  Dx:   Encounter Diagnosis     ICD-10-CM    1  Cervical segment dysfunction  M99 01    2  Thoracic segment dysfunction  M99 02    3  Hip pain, left  M25 552    4  Cervicalgia  M54 2    5  Sacroiliac dysfunction  M53 3                   Subjective:  Pt reports some soreness in neck and shoulders from white water rafting over the weekend  Low back and hip feeling better  Objective: See treatment diary below      Assessment: Tolerated treatment well  Patient demonstrated fatigue post treatment, exhibited good technique with therapeutic exercises and would benefit from continued PT  Most tightness noted in R UT and L sub-occipital today  Plan: Continue per plan of care        Precautions: none      Manuals    Scissor MET L ant torsion np np           Graston L glute med, piriformis 15 min 20 min 20 min 20 min      15 min   TPR R sub-occipitals 10 min 10 mn 10 min 10 min      10 min   sidelying L QL stretch 2 min 2 min 2 min 2 min      2 min   Neuro Re-Ed                          bridges 5"x20            Clamshells 5"x20            Prone hip ext x20                                                                                          TherEx                               Piriformis stretch 30"x3        30"x3    Hamstring stretch 30"x3        30"x3                 Ther Activity                                       Gait Training                                       Modalities

## 2022-08-09 ENCOUNTER — EVALUATION (OUTPATIENT)
Dept: PHYSICAL THERAPY | Facility: MEDICAL CENTER | Age: 47
End: 2022-08-09
Payer: COMMERCIAL

## 2022-08-09 DIAGNOSIS — M25.552 HIP PAIN, LEFT: ICD-10-CM

## 2022-08-09 DIAGNOSIS — M99.01 CERVICAL SEGMENT DYSFUNCTION: Primary | ICD-10-CM

## 2022-08-09 DIAGNOSIS — M53.3 SACROILIAC DYSFUNCTION: ICD-10-CM

## 2022-08-09 DIAGNOSIS — M99.02 THORACIC SEGMENT DYSFUNCTION: ICD-10-CM

## 2022-08-09 DIAGNOSIS — M54.2 CERVICALGIA: ICD-10-CM

## 2022-08-09 PROCEDURE — 97140 MANUAL THERAPY 1/> REGIONS: CPT | Performed by: PHYSICAL THERAPIST

## 2022-08-09 NOTE — LETTER
2022    Alex Jacobson, [address] [place] [id number]    Patient: Sb Westfall   YOB: 1975   Date of Visit: 2022     Encounter Diagnosis     ICD-10-CM    1  Cervical segment dysfunction  M99 01    2  Thoracic segment dysfunction  M99 02    3  Hip pain, left  M25 552    4  Cervicalgia  M54 2    5  Sacroiliac dysfunction  M53 3        Dear Dr Tanya Stockton:    Thank you for your recent referral of Sb Westfall  Please review the attached evaluation summary from Salima's recent visit  Please verify that you agree with the plan of care by signing the attached order  If you have any questions or concerns, please do not hesitate to call  I sincerely appreciate the opportunity to share in the care of one of your patients and hope to have another opportunity to work with you in the near future  Sincerely,    Catherine Barron, PT      Referring Provider:      I certify that I have read the below Plan of Care and certify the need for these services furnished under this plan of treatment while under my care  lAex Jacobson, [address] [place] [id number]  Via Fax: [phone]          PT Re-Evaluation     Today's date: 2022  Patient name: Sb Westfall  : 1975  MRN: [id number]  Referring provider: Crystal Hall DO  Dx:   Encounter Diagnosis     ICD-10-CM    1  Cervical segment dysfunction  M99 01    2  Thoracic segment dysfunction  M99 02    3  Hip pain, left  M25 552    4  Cervicalgia  M54 2    5  Sacroiliac dysfunction  M53 3        Start Time: 0800  Stop Time: 0835  Total time in clinic (min): 35 minutes    Assessment  Assessment details: Sb Westflal has attended 26  visits since initiating PT and has demonstrated overall improvement  Mobility and strength has improved with decreased reports of pain  Sb Westfall has demonstrated an improvement in function as well    Currently, she has made steady progress towards her goals, but continue to remain limited with prolonged excess activity, prolonged sitting and first thing in the morning  SIJ remaining stabilized  At this time, continued physical therapy is recommended in order to address their remaining impairments in effort to further improve function  She has reduced to 1x/week progressing to d/c  Impairments: abnormal gait, abnormal muscle tone, abnormal or restricted ROM, abnormal movement, activity intolerance, impaired physical strength, lacks appropriate home exercise program, weight-bearing intolerance and poor posture   Understanding of Dx/Px/POC: good   Prognosis: good    Goals  Short Term Goals:   1  Pain decreased 2 ratings in 4 weeks MET  2   ROM increased 10* in 4 weeks MET  3  Strength increased 1/2 grade in 4 weeks MET    Long Term Goals:   1  ADLS/IADLS in related activities improved to maximal level in 8 weeks  MET  2  Work performance improved to maximal level in 8 weeks PARTIALLY MET  3  Recreational activities are improved to maximal level in 8 weeks  PARTIALLY MET  4  [place] with HEP in 8 weeks  MET    Plan  Plan details: 1-2x/week  Patient would benefit from: skilled physical therapy  Planned modality interventions: cryotherapy  Planned therapy interventions: abdominal trunk stabilization, therapeutic exercise, stretching, strengthening, patient education, neuromuscular re-education, manual therapy, functional ROM exercises, flexibility and home exercise program  Frequency: 2x week  Duration in weeks: 8  Treatment plan discussed with: patient        Subjective Evaluation    History of Present Illness  Mechanism of injury: Pt reports continued improvement  Left sided hip/buttock discomfort persisting, but decreasing w/ prolonged sitting, increased activity or overuse  Overall less severe, less frequent and subsides more quickly  Neck pain and soreness persisting in the mornings     Pain  At best pain rating: 0  At worst pain ratin  Location: neck and back    Patient Goals  Patient goals for therapy: decreased pain, independence with ADLs/IADLs, increased strength, increased motion and return to sport/leisure activities          Objective     Palpation     Additional Palpation Details  Decreased tightness over cervical musculature; mild restrictions are persisting in sub-occipitals  Restrictions decreasing but persist over L glute med and hip flexor; intermittent QL tightness  Active Range of Motion   Cervical/Thoracic Spine       Cervical    Flexion: 42 degrees   Extension: 40 degrees      Left lateral flexion: 22 degrees      Right lateral flexion: 25 degrees      Left rotation: 70 degrees  Right rotation: 70 degrees         Left Shoulder   Normal active range of motion    Right Shoulder   Normal active range of motion    Lumbar   Flexion: 70 degrees   Extension: 10 degrees     Strength/Myotome Testing     Left Shoulder     Planes of Motion   Flexion: 5   Abduction: 5   External rotation at 90°: 5   Internal rotation at 90°: 5     Right Shoulder     Planes of Motion   Flexion: 5   Abduction: 5   External rotation at 90°: 5   Internal rotation at 90°: 5     Left Hip   Planes of Motion   Flexion: 5  Extension: 5  Abduction: 5    Right Hip   Planes of Motion   Flexion: 5  Extension: 5  Abduction: 5    Tests     Left Hip   Negative long sit                Precautions: none      Manuals             Scissor MET L ant torsion np            Graston L glute med, piriformis 15 min            TPR R sub-occipitals 10 min            sidelying L QL stretch np            Neuro Re-Ed                          bridges hep            clamshells hep            Prone hip ext hep                                                                                          TherEx                                       Piriformis stretch hep            Hamstring stretch hep                         Ther Activity Gait Training                                       Modalities

## 2022-08-09 NOTE — PROGRESS NOTES
PT Re-Evaluation     Today's date: 2022  Patient name: Kj Reynolds  : 1975  MRN: [id number]  Referring provider: Hunter Cheng DO  Dx:   Encounter Diagnosis     ICD-10-CM    1  Cervical segment dysfunction  M99 01    2  Thoracic segment dysfunction  M99 02    3  Hip pain, left  M25 552    4  Cervicalgia  M54 2    5  Sacroiliac dysfunction  M53 3        Start Time: 0800  Stop Time: 0835  Total time in clinic (min): 35 minutes    Assessment  Assessment details: Kj Reynolds has attended 26  visits since initiating PT and has demonstrated overall improvement  Mobility and strength has improved with decreased reports of pain  Kj Reynolds has demonstrated an improvement in function as well  Currently, she has made steady progress towards her goals, but continue to remain limited with prolonged excess activity, prolonged sitting and first thing in the morning  SIJ remaining stabilized  At this time, continued physical therapy is recommended in order to address their remaining impairments in effort to further improve function  She has reduced to 1x/week progressing to d/c  Impairments: abnormal gait, abnormal muscle tone, abnormal or restricted ROM, abnormal movement, activity intolerance, impaired physical strength, lacks appropriate home exercise program, weight-bearing intolerance and poor posture   Understanding of Dx/Px/POC: good   Prognosis: good    Goals  Short Term Goals:   1  Pain decreased 2 ratings in 4 weeks MET  2   ROM increased 10* in 4 weeks MET  3  Strength increased 1/2 grade in 4 weeks MET    Long Term Goals:   1  ADLS/IADLS in related activities improved to maximal level in 8 weeks  MET  2  Work performance improved to maximal level in 8 weeks PARTIALLY MET  3  Recreational activities are improved to maximal level in 8 weeks  PARTIALLY MET  4  [place] with HEP in 8 weeks   MET    Plan  Plan details: 1-2x/week  Patient would benefit from: skilled physical therapy  Planned modality interventions: cryotherapy  Planned therapy interventions: abdominal trunk stabilization, therapeutic exercise, stretching, strengthening, patient education, neuromuscular re-education, manual therapy, functional ROM exercises, flexibility and home exercise program  Frequency: 2x week  Duration in weeks: 8  Treatment plan discussed with: patient        Subjective Evaluation    History of Present Illness  Mechanism of injury: Pt reports continued improvement  Left sided hip/buttock discomfort persisting, but decreasing w/ prolonged sitting, increased activity or overuse  Overall less severe, less frequent and subsides more quickly  Neck pain and soreness persisting in the mornings  Pain  At best pain ratin  At worst pain ratin  Location: neck and back    Patient Goals  Patient goals for therapy: decreased pain, independence with ADLs/IADLs, increased strength, increased motion and return to sport/leisure activities          Objective     Palpation     Additional Palpation Details  Decreased tightness over cervical musculature; mild restrictions are persisting in sub-occipitals  Restrictions decreasing but persist over L glute med and hip flexor; intermittent QL tightness          Active Range of Motion   Cervical/Thoracic Spine       Cervical    Flexion: 42 degrees   Extension: 40 degrees      Left lateral flexion: 22 degrees      Right lateral flexion: 25 degrees      Left rotation: 70 degrees  Right rotation: 70 degrees         Left Shoulder   Normal active range of motion    Right Shoulder   Normal active range of motion    Lumbar   Flexion: 70 degrees   Extension: 10 degrees     Strength/Myotome Testing     Left Shoulder     Planes of Motion   Flexion: 5   Abduction: 5   External rotation at 90°: 5   Internal rotation at 90°: 5     Right Shoulder     Planes of Motion   Flexion: 5   Abduction: 5   External rotation at 90°: 5   Internal rotation at 90°: 5     Left Hip   Planes of Motion   Flexion: 5  Extension: 5  Abduction: 5    Right Hip   Planes of Motion   Flexion: 5  Extension: 5  Abduction: 5    Tests     Left Hip   Negative long sit                Precautions: none      Manuals 8/9            Scissor MET L ant torsion np            Graston L glute med, piriformis 15 min            TPR R sub-occipitals 10 min            sidelying L QL stretch np            Neuro Re-Ed 8/9                         bridges hep            clamshells hep            Prone hip ext hep                                                                                          TherEx 8/9                                      Piriformis stretch hep            Hamstring stretch hep                         Ther Activity                                       Gait Training                                       Modalities

## 2022-08-24 ENCOUNTER — OFFICE VISIT (OUTPATIENT)
Dept: PHYSICAL THERAPY | Facility: MEDICAL CENTER | Age: 47
End: 2022-08-24
Payer: COMMERCIAL

## 2022-08-24 DIAGNOSIS — M54.2 CERVICALGIA: ICD-10-CM

## 2022-08-24 DIAGNOSIS — M99.01 CERVICAL SEGMENT DYSFUNCTION: Primary | ICD-10-CM

## 2022-08-24 DIAGNOSIS — M53.3 SACROILIAC DYSFUNCTION: ICD-10-CM

## 2022-08-24 DIAGNOSIS — M25.552 HIP PAIN, LEFT: ICD-10-CM

## 2022-08-24 DIAGNOSIS — M99.02 THORACIC SEGMENT DYSFUNCTION: ICD-10-CM

## 2022-08-24 PROCEDURE — 97140 MANUAL THERAPY 1/> REGIONS: CPT | Performed by: PHYSICAL THERAPIST

## 2022-08-24 PROCEDURE — 97112 NEUROMUSCULAR REEDUCATION: CPT | Performed by: PHYSICAL THERAPIST

## 2022-08-24 NOTE — PROGRESS NOTES
Daily Note     Today's date: 2022  Patient name: Olman Sultana  : 1975  MRN: [id number]  Referring provider: Ayesha Hernadez DO  Dx:   Encounter Diagnosis     ICD-10-CM    1  Cervical segment dysfunction  M99 01    2  Thoracic segment dysfunction  M99 02    3  Hip pain, left  M25 552    4  Cervicalgia  M54 2    5  Sacroiliac dysfunction  M53 3                   Subjective: Pt reports doing well; she did move her daughter back to college w/ minimal increase in discomfort  Reports compliance w/ HEP has been fair  Objective: See treatment diary below      Assessment: Tolerated treatment well  Patient demonstrated fatigue post treatment, exhibited good technique with therapeutic exercises and would benefit from continued PT  Restrictions continue to decrease  She does demonstrate good understanding of exercise program        Plan: Continue per plan of care        Precautions: none      Manuals            Scissor MET L ant torsion np            Graston L glute med, piriformis 15 min 15 min           TPR R sub-occipitals 10 min 10 min           sidelying L QL stretch np            Neuro Re-Ed                         bridges hep 5"x20           clamshells hep 5"X20           Prone hip ext hep x20                                                                                         TherEx                                      Piriformis stretch hep 30"x3           Hamstring stretch hep 30"x3                        Ther Activity                                       Gait Training                                       Modalities

## 2022-09-08 ENCOUNTER — EVALUATION (OUTPATIENT)
Dept: PHYSICAL THERAPY | Facility: MEDICAL CENTER | Age: 47
End: 2022-09-08
Payer: COMMERCIAL

## 2022-09-08 DIAGNOSIS — M53.3 SACROILIAC DYSFUNCTION: ICD-10-CM

## 2022-09-08 DIAGNOSIS — M99.01 CERVICAL SEGMENT DYSFUNCTION: Primary | ICD-10-CM

## 2022-09-08 DIAGNOSIS — M54.2 CERVICALGIA: ICD-10-CM

## 2022-09-08 DIAGNOSIS — M99.02 THORACIC SEGMENT DYSFUNCTION: ICD-10-CM

## 2022-09-08 DIAGNOSIS — M25.552 HIP PAIN, LEFT: ICD-10-CM

## 2022-09-08 PROCEDURE — 97112 NEUROMUSCULAR REEDUCATION: CPT | Performed by: PHYSICAL THERAPIST

## 2022-09-08 PROCEDURE — 97140 MANUAL THERAPY 1/> REGIONS: CPT | Performed by: PHYSICAL THERAPIST

## 2022-09-08 NOTE — PROGRESS NOTES
PT Re-Evaluation     Today's date: 2022  Patient name: Rashard Leblanc  : 1975  MRN: [id number]  Referring provider: Alexis Pisano DO  Dx:   Encounter Diagnosis     ICD-10-CM    1  Cervical segment dysfunction  M99 01    2  Thoracic segment dysfunction  M99 02    3  Hip pain, left  M25 552    4  Cervicalgia  M54 2    5  Sacroiliac dysfunction  M53 3                   Assessment  Assessment details: Rashard Leblanc has attended 28  visits since initiating PT and has demonstrated overall improvement  Mobility and strength has improved with decreased reports of pain  Rashard Leblanc has demonstrated an improvement in function as well  Currently, she has made steady progress towards her goals, but continue to remain limited with prolonged excess activity, prolonged sitting and bending  SIJ remaining stabilized  She will continue as needed 1x/week every 1-2 weeks especially during moving process of home  Impairments: abnormal gait, abnormal muscle tone, abnormal or restricted ROM, abnormal movement, activity intolerance, impaired physical strength, lacks appropriate home exercise program, weight-bearing intolerance and poor posture   Understanding of Dx/Px/POC: good   Prognosis: good    Goals  Short Term Goals:   1  Pain decreased 2 ratings in 4 weeks MET  2   ROM increased 10* in 4 weeks MET  3  Strength increased 1/2 grade in 4 weeks MET    Long Term Goals:   1  ADLS/IADLS in related activities improved to maximal level in 8 weeks  MET  2  Work performance improved to maximal level in 8 weeks PARTIALLY MET  3  Recreational activities are improved to maximal level in 8 weeks  PARTIALLY MET  4  [place] with HEP in 8 weeks   MET    Plan  Plan details: 1-2x/week  Patient would benefit from: skilled physical therapy  Planned modality interventions: cryotherapy  Planned therapy interventions: abdominal trunk stabilization, therapeutic exercise, stretching, strengthening, patient education, neuromuscular re-education, manual therapy, functional ROM exercises, flexibility and home exercise program  Frequency: 1x week  Duration in weeks: 8  Treatment plan discussed with: patient        Subjective Evaluation    History of Present Illness  Mechanism of injury: Pt reports overall improvement  She does report increased L sided hip buttock discomfort after sleeping w/ different pillows over the weekend  Also sore from packing boxes in preparation for moving  She does avoid heavy lifting  Prolonged sitting at times remains challenging and painful  Neck soreness persists, but also decreasing  No longer experiencing headaches  Pain  At best pain ratin  At worst pain ratin  Location: neck and back    Patient Goals  Patient goals for therapy: decreased pain, independence with ADLs/IADLs, increased strength, increased motion and return to sport/leisure activities          Objective     Palpation     Additional Palpation Details  Decreased tightness over cervical musculature; mild restrictions are persisting in sub-occipitals  Most tightness noted over L piriformis and glute med  QL improved          Active Range of Motion   Cervical/Thoracic Spine       Cervical    Flexion: 65 degrees   Extension: 45 degrees      Left lateral flexion: 25 degrees      Right lateral flexion: 25 degrees      Left rotation: 75 degrees  Right rotation: 75 degrees         Left Shoulder   Normal active range of motion    Right Shoulder   Normal active range of motion    Lumbar   Flexion: 65 degrees   Extension: 15 degrees     Strength/Myotome Testing     Left Shoulder     Planes of Motion   Flexion: 5   Abduction: 5   External rotation at 90°: 5   Internal rotation at 90°: 5     Right Shoulder     Planes of Motion   Flexion: 5   Abduction: 5   External rotation at 90°: 5   Internal rotation at 90°: 5     Left Hip   Planes of Motion   Flexion: 5  Extension: 5  Abduction: 5    Right Hip   Planes of Motion Flexion: 5  Extension: 5  Abduction: 5    Tests     Left Hip   Negative long sit                Precautions: none      Manuals 9/8            Scissor MET L ant torsion np            Graston L glute med, piriformis 15 min            TPR R sub-occipitals 10 min            sidelying L QL stretch 3 min            Neuro Re-Ed 9/8                         bridges 5"x20            clamshells 5"x20            Prone hip ext x20                                                                                          TherEx 9/8                                      Piriformis stretch 30"x3            Hamstring stretch 30"x3                         Ther Activity                                       Gait Training                                       Modalities

## 2022-09-14 ENCOUNTER — OFFICE VISIT (OUTPATIENT)
Dept: PHYSICAL THERAPY | Facility: MEDICAL CENTER | Age: 47
End: 2022-09-14
Payer: COMMERCIAL

## 2022-09-14 DIAGNOSIS — M99.01 CERVICAL SEGMENT DYSFUNCTION: Primary | ICD-10-CM

## 2022-09-14 DIAGNOSIS — M99.02 THORACIC SEGMENT DYSFUNCTION: ICD-10-CM

## 2022-09-14 DIAGNOSIS — M25.552 HIP PAIN, LEFT: ICD-10-CM

## 2022-09-14 DIAGNOSIS — M53.3 SACROILIAC DYSFUNCTION: ICD-10-CM

## 2022-09-14 DIAGNOSIS — M54.2 CERVICALGIA: ICD-10-CM

## 2022-09-14 PROCEDURE — 97140 MANUAL THERAPY 1/> REGIONS: CPT | Performed by: PHYSICAL THERAPIST

## 2022-09-14 NOTE — PROGRESS NOTES
Daily Note     Today's date: 2022  Patient name: Tatiana Godoy  : 1975  MRN: [id number]  Referring provider: Gideon Flanagan DO  Dx:   Encounter Diagnosis     ICD-10-CM    1  Cervical segment dysfunction  M99 01    2  Thoracic segment dysfunction  M99 02    3  Hip pain, left  M25 552    4  Cervicalgia  M54 2    5  Sacroiliac dysfunction  M53 3                   Subjective: Pt reports relief following last visit  She has been very active at home packing house in preparation for move  Today she c/o mild L buttock tightness  Neck and headache feeling much better  Objective: See treatment diary below      Assessment: Tolerated treatment well  Patient demonstrated fatigue post treatment, exhibited good technique with therapeutic exercises and would benefit from continued PT  Most tightness over piriformis  Minimal restrictions noted in cervical musculature  Plan: Continue per plan of care        Precautions: none      Manuals            Scissor MET L ant torsion np            Graston L glute med, piriformis 15 min 15 min           TPR R sub-occipitals 10 min 10 min           sidelying L QL stretch 3 min 3 min           Neuro Re-Ed                         bridges 5"x20 5x20           clamshells 5"x20 5"x20           Prone hip ext x20 x20                                                                                         TherEx                                      Piriformis stretch 30"x3 30"x3           Hamstring stretch 30"x3 30"x3                        Ther Activity                                       Gait Training                                       Modalities

## 2022-09-27 ENCOUNTER — OFFICE VISIT (OUTPATIENT)
Dept: PHYSICAL THERAPY | Facility: MEDICAL CENTER | Age: 47
End: 2022-09-27
Payer: COMMERCIAL

## 2022-09-27 DIAGNOSIS — M25.552 HIP PAIN, LEFT: ICD-10-CM

## 2022-09-27 DIAGNOSIS — M99.02 THORACIC SEGMENT DYSFUNCTION: ICD-10-CM

## 2022-09-27 DIAGNOSIS — M99.01 CERVICAL SEGMENT DYSFUNCTION: Primary | ICD-10-CM

## 2022-09-27 DIAGNOSIS — M54.2 CERVICALGIA: ICD-10-CM

## 2022-09-27 DIAGNOSIS — M53.3 SACROILIAC DYSFUNCTION: ICD-10-CM

## 2022-09-27 PROCEDURE — 97140 MANUAL THERAPY 1/> REGIONS: CPT | Performed by: PHYSICAL THERAPIST

## 2022-09-27 NOTE — PROGRESS NOTES
Daily Note     Today's date: 2022  Patient name: Migel Nassar  : 1975  MRN: [id number]  Referring provider: Lilly Chino DO  Dx:   Encounter Diagnosis     ICD-10-CM    1  Cervical segment dysfunction  M99 01    2  Thoracic segment dysfunction  M99 02    3  Hip pain, left  M25 552    4  Cervicalgia  M54 2    5  Sacroiliac dysfunction  M53 3                   Subjective: Pt reports feeling much better  She has been very active packing  And moving for the past few weeks w/ minimal pain only  Objective: See treatment diary below      Assessment: Tolerated treatment well  Patient demonstrated fatigue post treatment and exhibited good technique with therapeutic exercises  Sig decrease in restrictions  She has remained compliant w/ HEP      Plan: Plan to d/c ; will call for f/u as needed     Precautions: none      Manuals           Scissor MET L ant torsion np            Graston L glute med, piriformis 15 min 15 min 15 min          TPR R sub-occipitals 10 min 10 min 10 min          sidelying L QL stretch 3 min 3 min 3 min          Neuro Re-Ed                         bridges 5"x20 5x20           clamshells 5"x20 5"x20           Prone hip ext x20 x20                                                                                         TherEx                                      Piriformis stretch 30"x3 30"x3           Hamstring stretch 30"x3 30"x3                        Ther Activity                                       Gait Training                                       Modalities

## 2022-10-06 ENCOUNTER — APPOINTMENT (OUTPATIENT)
Dept: RADIOLOGY | Facility: MEDICAL CENTER | Age: 47
End: 2022-10-06
Payer: COMMERCIAL

## 2022-10-06 DIAGNOSIS — R07.9 CHEST PAIN, UNSPECIFIED TYPE: ICD-10-CM

## 2022-10-06 PROCEDURE — 71046 X-RAY EXAM CHEST 2 VIEWS: CPT

## 2022-10-10 ENCOUNTER — CONSULT (OUTPATIENT)
Dept: GASTROENTEROLOGY | Facility: CLINIC | Age: 47
End: 2022-10-10
Payer: COMMERCIAL

## 2022-10-10 VITALS
DIASTOLIC BLOOD PRESSURE: 72 MMHG | WEIGHT: 151.8 LBS | SYSTOLIC BLOOD PRESSURE: 140 MMHG | HEIGHT: 63 IN | BODY MASS INDEX: 26.9 KG/M2 | TEMPERATURE: 97.8 F

## 2022-10-10 DIAGNOSIS — Z12.11 SCREENING FOR COLON CANCER: ICD-10-CM

## 2022-10-10 DIAGNOSIS — K21.9 GASTROESOPHAGEAL REFLUX DISEASE WITHOUT ESOPHAGITIS: Primary | ICD-10-CM

## 2022-10-10 PROCEDURE — 99244 OFF/OP CNSLTJ NEW/EST MOD 40: CPT | Performed by: INTERNAL MEDICINE

## 2022-10-10 RX ORDER — PANTOPRAZOLE SODIUM 40 MG/1
TABLET, DELAYED RELEASE ORAL
COMMUNITY
Start: 2022-10-06

## 2022-10-10 RX ORDER — FAMOTIDINE 40 MG/1
40 TABLET, FILM COATED ORAL EVERY MORNING
COMMUNITY
Start: 2022-10-06

## 2022-10-10 NOTE — PROGRESS NOTES
Answers for HPI/ROS submitted by the patient on 10/6/2022  Chronicity: chronic  Onset: more than 1 month ago  Onset quality: gradual  Frequency: daily  Episode duration: 8 hours  Progression since onset: waxing and waning  Pain location: epigastric region  Pain - numeric: 8/10  Pain quality: burning  Radiates to: right shoulder, back  anorexia: No  arthralgias: No  belching: Yes  constipation: No  diarrhea: No  dysuria: No  fever: No  flatus: No  frequency: No  headaches: No  hematochezia: No  hematuria: No  melena: No  myalgias: No  nausea: No  weight loss: No  vomiting: No  Aggravated by: certain positions, drinking alcohol, NSAIDs  Relieved by: eating, sitting up, standing    [facility] - Outpatient Consultation  Iram Rooney 51 y o  female MRN: [id number]  Encounter: [id number]          ASSESSMENT AND PLAN:      1  Gastroesophageal reflux disease without esophagitis- will try conservative management as the symptoms are new over the last 6 months and she does not have longstanding history of acid reflux disease which would predispose her to Ye's esophagus  After extensive discussion we discuss taking PPI for 8-12 weeks and trying to wean off  If she is unable to wean off a can consider endoscopic evaluation at that time with 24 hour pH study   -discussed importance of weight loss    2  Screening for colon cancer-discuss she is at the screening age for colorectal cancer and she will schedule for an appointment within next year as she is currently not interested in undergoing colonoscopy evaluation would like to hold off     ______________________________________________________________________    HPI:      She is a 70-year-old female presents here for evaluation of acid reflux disease and screening colonoscopy  She has had multiple antibiotics for dental work for the last 6 months and during this  She has had increased acid reflux  No new medications over this time  Denies any weight change  She did grow up with well [place] is potentially possible that she may have had H pylori which was treated with these antibiotics which may have made acid reflux worse but overall reasoning of worsening acid reflux with antibiotic use is unclear  She was recently started on pantoprazole once daily and Pepcid with improvement of symptoms  Soon only taking this for couple of days and will likely try several weeks of trial prior to endoscopic evaluation  She is average risk for colorectal cancer and has not had a colonoscopy in the past       REVIEW OF SYSTEMS:    CONSTITUTIONAL: Denies any fever, chills, rigors, and weight loss  HEENT: No earache or tinnitus  Denies hearing loss or visual disturbances  CARDIOVASCULAR: No chest pain or palpitations  RESPIRATORY: Denies any cough, hemoptysis, shortness of breath or dyspnea on exertion  GASTROINTESTINAL: As noted in the History of Present Illness  GENITOURINARY: No problems with urination  Denies any hematuria or dysuria  NEUROLOGIC: No dizziness or vertigo, denies headaches  MUSCULOSKELETAL: Denies any muscle or joint pain  SKIN: Denies skin rashes or itching  ENDOCRINE: Denies excessive thirst  Denies intolerance to heat or cold  PSYCHOSOCIAL: Denies depression or anxiety  Denies any recent memory loss  Historical Information   Past Medical History:   Diagnosis Date   • Atrial flutter [facility])    • Fibroid    • Herniated lumbar intervertebral disc    • Hypertension    • Intestinal obstruction (HCC)    • Scoliosis    • Urinary tract infection    • Varicella      Past Surgical History:   Procedure Laterality Date   • APPENDECTOMY     • BACK SURGERY  02/2015    Lower back  L5-S1 microdiscectomy    • BREAST CYST ASPIRATION Right 12/2019   • CYST REMOVAL  2007    Excision of vaginal cyst or tumor  Jayro Herron     • INSERTION OF INTRAUTERINE DEVICE (IUD)  04/07/2021    Mirena   • OTHER SURGICAL HISTORY      Surgical lysis of intestinal adhesions   • US BREAST CYST ASPIRATION RIGHT INITIAL Right 12/3/2019   • VOLVULUS REDUCTION     • WISDOM TOOTH EXTRACTION       Social History   Social History     Substance and Sexual Activity   Alcohol Use Yes   • Alcohol/week: 4 0 standard drinks   • Types: 4 Standard drinks or equivalent per week    Comment: Weekends only     Social History     Substance and Sexual Activity   Drug Use No     Social History     Tobacco Use   Smoking Status Former Smoker   • Packs/day: 0 00   • Years: 0 00   • Pack years: 0 00   • Types: Cigarettes   Smokeless Tobacco Never Used     Family History   Problem Relation Age of Onset   • Breast cancer Mother 71   • BRCA1 Negative Mother    • BRCA2 Negative Mother    • Hypertension Father    • Leukemia Father 71   • Stroke Maternal Grandmother    • Hypertension Family    • No Known Problems Daughter    • No Known Problems Maternal Grandfather    • No Known Problems Paternal Grandmother    • No Known Problems Paternal Grandfather    • No Known Problems Daughter    • No Known Problems Paternal Aunt    • No Known Problems Paternal Aunt        Meds/Allergies       Current Outpatient Medications:   •  famotidine (PEPCID) 40 MG tablet  •  levonorgestrel (MIRENA) 20 MCG/24HR IUD  •  pantoprazole (PROTONIX) 40 mg tablet    Allergies   Allergen Reactions   • Bactrim [Sulfamethoxazole-Trimethoprim] Other (See Comments)     "Resulted in bowel obstruction"   • Ciprofloxacin Other (See Comments)     "Resulted in bladder problems"   • Clindamycin      Other reaction(s): CLINDAMYCIN  Other reaction(s): Other (See Comments)  Unknown Reaction from childhood   • Gentamicin      Other reaction(s): Other (See Comments)  Unknown Reaction from childhood  Other reaction(s): Other (See Comments)  Unknown Reaction from childhood   • Sulfa Antibiotics      Other reaction(s):  Other (See Comments)  BOWEL OBSTRUCTION           Objective     Blood pressure 140/72, temperature 97 8 °F (36 6 °C), temperature source Tympanic, height 5' 3" (1 6 m), weight 68 9 kg (151 lb 12 8 oz), not currently breastfeeding  Body mass index is 26 89 kg/m²  PHYSICAL EXAM:      General Appearance:   Alert, cooperative, no distress   HEENT:   Normocephalic, atraumatic, anicteric      Neck:  Supple, symmetrical, trachea midline   Lungs:   Clear to auscultation bilaterally; no rales, rhonchi or wheezing; respirations unlabored    Heart[de-identified]   Regular rate and rhythm; no murmur, rub, or gallop  Abdomen:   Soft, non-tender, non-distended; normal bowel sounds; no masses, no organomegaly    Genitalia:   Deferred    Rectal:   Deferred    Extremities:  No cyanosis, clubbing or edema    Pulses:  2+ and symmetric    Skin:  No jaundice, rashes, or lesions    Lymph nodes:  No palpable cervical lymphadenopathy        Lab Results:   No visits with results within 1 Day(s) from this visit     Latest known visit with results is:   Transcribe Orders on 12/07/2021   Component Date Value   • WBC 12/07/2021 5 78    • RBC 12/07/2021 4 72    • Hemoglobin 12/07/2021 14 7    • Hematocrit 12/07/2021 46 2 (A)   • MCV 12/07/2021 98    • MCH 12/07/2021 31 1    • MCHC 12/07/2021 31 8    • RDW 12/07/2021 14 0    • MPV 12/07/2021 11 1    • Platelets 03/06/8902 293    • nRBC 12/07/2021 0    • Neutrophils Relative 12/07/2021 62    • Immat GRANS % 12/07/2021 0    • Lymphocytes Relative 12/07/2021 23    • Monocytes Relative 12/07/2021 8    • Eosinophils Relative 12/07/2021 6    • Basophils Relative 12/07/2021 1    • Neutrophils Absolute 12/07/2021 3 54    • Immature Grans Absolute 12/07/2021 0 02    • Lymphocytes Absolute 12/07/2021 1 35    • Monocytes Absolute 12/07/2021 0 46    • Eosinophils Absolute 12/07/2021 0 33    • Basophils Absolute 12/07/2021 0 08    • Sodium 12/07/2021 135 (A)   • Potassium 12/07/2021 4 4    • Chloride 12/07/2021 102    • CO2 12/07/2021 26    • ANION GAP 12/07/2021 7    • BUN 12/07/2021 16    • Creatinine 12/07/2021 0 82    • Glucose, Fasting 12/07/2021 101 (A)   • Calcium 12/07/2021 9 4    • AST 12/07/2021 26    • ALT 12/07/2021 40    • Alkaline Phosphatase 12/07/2021 30 (A)   • Total Protein 12/07/2021 7 7    • Albumin 12/07/2021 4 1    • Total Bilirubin 12/07/2021 0 57    • eGFR 12/07/2021 86    • Cholesterol 12/07/2021 220 (A)   • Triglycerides 12/07/2021 70    • HDL, Direct 12/07/2021 66    • LDL Calculated 12/07/2021 140 (A)   • Non-HDL-Chol (CHOL-HDL) 12/07/2021 154    • LDL Direct 12/07/2021 127 (A)   • Ferritin 12/07/2021 83    • TSH 3RD GENERATON 12/07/2021 1 560    • Free T4 12/07/2021 0 88    • Vit D, 25-Hydroxy 12/07/2021 31 2          Radiology Results:   XR chest pa & lateral    Result Date: 10/8/2022  Narrative: CHEST INDICATION:   R07 9: Chest pain, unspecified  Possible GERD  Evaluate for aspiration  COMPARISON:  None EXAM PERFORMED/VIEWS:  XR CHEST PA & LATERAL  DUAL ENERGY SUBTRACTION  FINDINGS: Cardiomediastinal silhouette appears unremarkable  The lungs are clear  No pneumothorax or pleural effusion  Mild curvature of the spine  Impression: No acute cardiopulmonary disease   Workstation performed: [id number]

## 2022-10-25 ENCOUNTER — TELEPHONE (OUTPATIENT)
Dept: GASTROENTEROLOGY | Facility: CLINIC | Age: 47
End: 2022-10-25

## 2022-10-25 NOTE — TELEPHONE ENCOUNTER
Patients GI provider:  Dr Obey Pack    Number to return call: [phone]    Reason for call: Pt calling about Reflux/PPI medication  Note states she should use PPI 8-12 weeks and wean off  If unable to wean off  The consider EGD  She would like to discuss scheduling EGD now      Scheduled procedure/appointment date if applicable: Apt/procedure 10/10/22

## 2022-10-26 NOTE — TELEPHONE ENCOUNTER
Spoke with patient  She has noticed her symptoms have slightly improved, but have not completely gone away  She is currently taking pepcid 40 mg in the morning and pantoprazole 40 mg at night  Instructed patient to take her pantoprazole in the morning 30 minutes before eating and pepcid at bedtime  She will try doing that for 8-12 weeks per Dr Sabina Mclain note, then reconsider EGD

## 2022-11-02 ENCOUNTER — OFFICE VISIT (OUTPATIENT)
Dept: CARDIOLOGY CLINIC | Facility: MEDICAL CENTER | Age: 47
End: 2022-11-02

## 2022-11-02 VITALS
OXYGEN SATURATION: 98 % | HEIGHT: 63 IN | BODY MASS INDEX: 26.4 KG/M2 | HEART RATE: 100 BPM | SYSTOLIC BLOOD PRESSURE: 132 MMHG | WEIGHT: 149 LBS | DIASTOLIC BLOOD PRESSURE: 82 MMHG

## 2022-11-02 DIAGNOSIS — R00.2 PALPITATIONS: Primary | ICD-10-CM

## 2022-11-02 DIAGNOSIS — I10 ESSENTIAL HYPERTENSION: ICD-10-CM

## 2022-11-02 DIAGNOSIS — R07.89 OTHER CHEST PAIN: ICD-10-CM

## 2022-11-02 RX ORDER — FUROSEMIDE 40 MG/1
TABLET ORAL
COMMUNITY
Start: 2022-10-18

## 2022-11-02 NOTE — PROGRESS NOTES
Cardiology   Jazzmine Peraza 51 y o  female MRN: [id number]        Impression:  1  Palpitations - resolved  2  Hypertension - borderline  3   Chest pain - atypical for cardiac etiology  Appears to be GI        Recommendations:  1  Check fasting lipid panel and complete metabolic profile in Spring 2023   2  Check CT coronary calcium score  3  Follow up in 12 months  HPI: Jazzmine Peraza is a 51y o  year old female with history of premature ventricular contractions who presents for follow up after 4 years  Has chest pain, worse at rest, improved with exertion  No further palpitations  Only on furosemide  Review of Systems   Constitutional: Negative  HENT: Negative  Eyes: Negative  Respiratory: Positive for chest tightness  Negative for shortness of breath  Cardiovascular: Negative for chest pain, palpitations and leg swelling  Gastrointestinal: Negative  Endocrine: Negative  Genitourinary: Negative  Musculoskeletal: Negative  Skin: Negative  Allergic/Immunologic: Negative  Neurological: Negative  Hematological: Negative  Psychiatric/Behavioral: Negative  All other systems reviewed and are negative  Past Medical History:   Diagnosis Date   • Atrial flutter [facility])    • Fibroid    • Herniated lumbar intervertebral disc    • Hypertension    • Intestinal obstruction (HCC)    • Scoliosis    • Urinary tract infection    • Varicella      Past Surgical History:   Procedure Laterality Date   • APPENDECTOMY     • BACK SURGERY  02/2015    Lower back  L5-S1 microdiscectomy    • BREAST CYST ASPIRATION Right 12/2019   • CYST REMOVAL  2007    Excision of vaginal cyst or tumor  Darling Espinoza     • INSERTION OF INTRAUTERINE DEVICE (IUD)  04/07/2021    Mirena   • OTHER SURGICAL HISTORY      Surgical lysis of intestinal adhesions   • US BREAST CYST ASPIRATION RIGHT INITIAL Right 12/3/2019   • VOLVULUS REDUCTION     • WISDOM TOOTH EXTRACTION       Social History Substance and Sexual Activity   Alcohol Use Yes   • Alcohol/week: 4 0 standard drinks   • Types: 4 Standard drinks or equivalent per week    Comment: Weekends only     Social History     Substance and Sexual Activity   Drug Use No     Social History     Tobacco Use   Smoking Status Former Smoker   • Packs/day: 0 00   • Years: 0 00   • Pack years: 0 00   • Types: Cigarettes   Smokeless Tobacco Never Used     Family History   Problem Relation Age of Onset   • Breast cancer Mother 71   • BRCA1 Negative Mother    • BRCA2 Negative Mother    • Hypertension Father    • Leukemia Father 71   • Stroke Maternal Grandmother    • Hypertension Family    • No Known Problems Daughter    • No Known Problems Maternal Grandfather    • No Known Problems Paternal Grandmother    • No Known Problems Paternal Grandfather    • No Known Problems Daughter    • No Known Problems Paternal Aunt    • No Known Problems Paternal Aunt        Allergies: Allergies   Allergen Reactions   • Bactrim [Sulfamethoxazole-Trimethoprim] Other (See Comments)     "Resulted in bowel obstruction"   • Ciprofloxacin Other (See Comments)     "Resulted in bladder problems"   • Clindamycin      Other reaction(s): CLINDAMYCIN  Other reaction(s): Other (See Comments)  Unknown Reaction from childhood   • Gentamicin      Other reaction(s): Other (See Comments)  Unknown Reaction from childhood  Other reaction(s): Other (See Comments)  Unknown Reaction from childhood   • Sulfa Antibiotics      Other reaction(s):  Other (See Comments)  BOWEL OBSTRUCTION       Medications:     Current Outpatient Medications:   •  famotidine (PEPCID) 40 MG tablet, 40 mg every morning, Disp: , Rfl:   •  furosemide (LASIX) 40 mg tablet, , Disp: , Rfl:   •  levonorgestrel (MIRENA) 20 MCG/24HR IUD, 1 each by Intrauterine route once, Disp: , Rfl:   •  pantoprazole (PROTONIX) 40 mg tablet, TAKE 1 TABLET BY MOUTH EVERY DAY AT NIGHT, Disp: , Rfl:       Wt Readings from Last 3 Encounters:   11/02/22 67 6 kg (149 lb)   10/10/22 68 9 kg (151 lb 12 8 oz)   03/16/22 71 kg (156 lb 9 6 oz)     Temp Readings from Last 3 Encounters:   10/10/22 97 8 °F (36 6 °C) (Tympanic)   06/16/20 (!) 97 2 °F (36 2 °C)   01/08/20 98 2 °F (36 8 °C) (Oral)     BP Readings from Last 3 Encounters:   11/02/22 132/82   10/10/22 140/72   03/16/22 118/78     Pulse Readings from Last 3 Encounters:   11/02/22 100   05/19/21 84   04/07/21 90         Physical Exam  HENT:      Head: Atraumatic  Mouth/Throat:      Mouth: Mucous membranes are moist    Eyes:      Extraocular Movements: Extraocular movements intact  Cardiovascular:      Rate and Rhythm: Normal rate  Heart sounds: Normal heart sounds  Pulmonary:      Effort: Pulmonary effort is normal       Breath sounds: Normal breath sounds  Abdominal:      General: Abdomen is flat  Tenderness: There is no abdominal tenderness  Musculoskeletal:         General: Normal range of motion  Cervical back: Normal range of motion  Skin:     General: Skin is warm  Neurological:      General: No focal deficit present  Mental Status: She is alert and oriented to person, place, and time     Psychiatric:         Mood and Affect: Mood normal          Behavior: Behavior normal            Laboratory Studies:  CMP:  Lab Results   Component Value Date    K 4 4 12/07/2021     12/07/2021    CO2 26 12/07/2021    BUN 16 12/07/2021    CREATININE 0 82 12/07/2021    AST 26 12/07/2021    ALT 40 12/07/2021    EGFR 86 12/07/2021       Lipid Profile:   No results found for: CHOL  Lab Results   Component Value Date    HDL 66 12/07/2021     Lab Results   Component Value Date    LDLCALC 140 (H) 12/07/2021     Lab Results   Component Value Date    TRIG 70 12/07/2021       Cardiac testing:   EKG reviewed personally: LYUDMILA HENDRIX

## 2022-11-02 NOTE — PATIENT INSTRUCTIONS
Recommendations:  1  Check fasting lipid panel and complete metabolic profile in Spring 2023   2  Check CT coronary calcium score  3  Follow up in 12 months

## 2022-11-27 ENCOUNTER — HOSPITAL ENCOUNTER (OUTPATIENT)
Dept: CT IMAGING | Facility: HOSPITAL | Age: 47
Discharge: HOME | End: 2022-11-27

## 2022-11-27 DIAGNOSIS — R07.89 OTHER CHEST PAIN: ICD-10-CM

## 2022-12-09 PROBLEM — Z12.11 SCREENING FOR COLON CANCER: Status: RESOLVED | Noted: 2022-10-10 | Resolved: 2022-12-09

## 2022-12-13 ENCOUNTER — HOSPITAL ENCOUNTER (OUTPATIENT)
Dept: RADIOLOGY | Facility: MEDICAL CENTER | Age: 47
Discharge: HOME | End: 2022-12-13

## 2022-12-13 VITALS — BODY MASS INDEX: 26.4 KG/M2 | WEIGHT: 149 LBS | HEIGHT: 63 IN

## 2022-12-13 DIAGNOSIS — Z12.31 ENCOUNTER FOR SCREENING MAMMOGRAM FOR BREAST CANCER: ICD-10-CM

## 2022-12-27 ENCOUNTER — TELEPHONE (OUTPATIENT)
Dept: CARDIOLOGY CLINIC | Facility: CLINIC | Age: 47
End: 2022-12-27

## 2022-12-27 NOTE — TELEPHONE ENCOUNTER
Called patient and gave results  ----- Message from Rachael Dupree MD sent at 12/22/2022 11:50 AM EST -----    Please let patient know CT coronary calcium score was 0, which is the best result  Thanks

## 2023-03-16 ENCOUNTER — OFFICE VISIT (OUTPATIENT)
Dept: GASTROENTEROLOGY | Facility: CLINIC | Age: 48
End: 2023-03-16

## 2023-03-16 VITALS
HEIGHT: 63 IN | BODY MASS INDEX: 27.46 KG/M2 | DIASTOLIC BLOOD PRESSURE: 80 MMHG | SYSTOLIC BLOOD PRESSURE: 130 MMHG | WEIGHT: 155 LBS | TEMPERATURE: 97.8 F

## 2023-03-16 DIAGNOSIS — L90.5 SCAR TISSUE: ICD-10-CM

## 2023-03-16 DIAGNOSIS — R07.89 OTHER CHEST PAIN: ICD-10-CM

## 2023-03-16 DIAGNOSIS — Z12.11 SCREENING FOR COLON CANCER: Primary | ICD-10-CM

## 2023-03-16 DIAGNOSIS — K21.9 GASTROESOPHAGEAL REFLUX DISEASE WITHOUT ESOPHAGITIS: ICD-10-CM

## 2023-03-16 DIAGNOSIS — R10.13 DYSPEPSIA: ICD-10-CM

## 2023-03-16 RX ORDER — POLYETHYLENE GLYCOL 3350 17 G/17G
POWDER, FOR SOLUTION ORAL
Qty: 238 G | Refills: 0 | Status: SHIPPED | OUTPATIENT
Start: 2023-03-16

## 2023-03-16 RX ORDER — CHOLECALCIFEROL (VITAMIN D3) 125 MCG
500 CAPSULE ORAL DAILY
COMMUNITY

## 2023-03-16 RX ORDER — BISACODYL 5 MG/1
5 TABLET, DELAYED RELEASE ORAL ONCE
Qty: 2 TABLET | Refills: 0 | Status: SHIPPED | OUTPATIENT
Start: 2023-03-16 | End: 2023-03-16

## 2023-03-16 NOTE — PATIENT INSTRUCTIONS
Scheduled date of colonoscopy/EGD (as of today): 05/10/2023  Physician performing colonoscopy: Dr Renetta King   Location of colonoscopy:  [facility]  Bowel prep reviewed with patient: Miralax/Dulcolax   Instructions reviewed with patient by: Nicola Juan   Clearances:  N/A

## 2023-03-16 NOTE — PROGRESS NOTES
[facility] - Outpatient Consultation  Casie Zamora 49 y o  female MRN: [id number]  Encounter: [id number]          ASSESSMENT AND PLAN:      1  Other chest pain  - EGD; Future  - Espoh manometry/24hr ph; Future    2  Gastroesophageal reflux disease without esophagitis  - EGD; Future  - Espoh manometry/24hr ph; Future    3  Screening for colon cancer  - Colonoscopy; Future  - polyethylene glycol (GLYCOLAX) 17 GM/SCOOP powder; At 5pm take 5mgx2 dulcolax  At 6pm 32oz miralax in 64oz gatorade  Drink 8oz glass every 5 mins until 32oz finished  Drink remaining as rec  Dispense: 238 g; Refill: 0  - bisacodyl (DULCOLAX) 5 mg EC tablet; Take 1 tablet (5 mg total) by mouth once for 1 dose  Dispense: 2 tablet; Refill: 0    4  Scar tissue  5  Dyspepsia  - Celiac Disease Panel; Future due to atypical symptoms we will plan for esophageal manometry and 24-hour pH study    She is very nervous regarding colonoscopy due to previous history of small bowel obstruction and scarring resulting in colon and small bowel resection  ______________________________________________________________________    HPI:      She is a 62-year-old female with history of acid reflux disease, mall bowel/colon resection complicated by scarring, dyspepsia presents for follow-up evaluation  She has symptoms of atypical reflux which completely resolved when she was in [place] for 2 weeks  Some of her symptoms seem to be stress-induced  The symptoms are bothersome and result atypical discomfort  He is also due for index screening colonoscopy evaluation      Answers for HPI/ROS submitted by the patient on 3/9/2023  Chronicity: chronic  Onset: more than 1 year ago  Onset quality: sudden  Frequency: daily  Episode duration: 24 Hours  Progression since onset: waxing and waning  Pain location: LUQ, epigastric region  Pain - numeric: 6/10  Pain quality: aching, cramping, a sensation of fullness  Radiates to: chest, back  anorexia: Yes  arthralgias: Yes  belching: Yes  constipation: No  diarrhea: No  dysuria: No  fever: No  flatus: Yes  frequency: No  headaches: Yes  hematochezia: No  hematuria: No  melena: No  myalgias: Yes  nausea: Yes  weight loss: Yes  vomiting: No  Aggravated by: certain positions, coughing  Relieved by: activity, belching, certain positions, movement, palpation, passing flatus, sitting up, standing        REVIEW OF SYSTEMS:    CONSTITUTIONAL: Denies any fever, chills, rigors, and weight loss  HEENT: No earache or tinnitus  Denies hearing loss or visual disturbances  CARDIOVASCULAR: No chest pain or palpitations  RESPIRATORY: Denies any cough, hemoptysis, shortness of breath or dyspnea on exertion  GASTROINTESTINAL: As noted in the History of Present Illness  GENITOURINARY: No problems with urination  Denies any hematuria or dysuria  NEUROLOGIC: No dizziness or vertigo, denies headaches  MUSCULOSKELETAL: Denies any muscle or joint pain  SKIN: Denies skin rashes or itching  ENDOCRINE: Denies excessive thirst  Denies intolerance to heat or cold  PSYCHOSOCIAL: Denies depression or anxiety  Denies any recent memory loss  Historical Information   Past Medical History:   Diagnosis Date   • Atrial flutter [facility])    • Fibroid    • Herniated lumbar intervertebral disc    • Hypertension    • Intestinal obstruction (HCC)    • Scoliosis    • Urinary tract infection    • Varicella      Past Surgical History:   Procedure Laterality Date   • APPENDECTOMY     • BACK SURGERY  02/2015    Lower back  L5-S1 microdiscectomy    • BREAST CYST ASPIRATION Right 12/2019   • CYST REMOVAL  2007    Excision of vaginal cyst or tumor  [facility] Ann Olive[facility]     • INSERTION OF INTRAUTERINE DEVICE (IUD)  04/07/2021    Mirena   • OTHER SURGICAL HISTORY      Surgical lysis of intestinal adhesions   • US BREAST CYST ASPIRATION RIGHT INITIAL Right 12/3/2019   • VOLVULUS REDUCTION     • WISDOM TOOTH EXTRACTION       Social History Social History     Substance and Sexual Activity   Alcohol Use Yes   • Alcohol/week: 4 0 standard drinks   • Types: 4 Standard drinks or equivalent per week    Comment: Weekends only     Social History     Substance and Sexual Activity   Drug Use No     Social History     Tobacco Use   Smoking Status Former   • Packs/day: 0 00   • Years: 0 00   • Pack years: 0 00   • Types: Cigarettes   Smokeless Tobacco Never     Family History   Problem Relation Age of Onset   • Breast cancer Mother 71   • BRCA1 Negative Mother    • BRCA2 Negative Mother    • Hypertension Father    • Leukemia Father 71   • No Known Problems Daughter    • No Known Problems Daughter    • Stroke Maternal Grandmother    • No Known Problems Maternal Grandfather    • No Known Problems Paternal Grandmother    • No Known Problems Paternal Grandfather    • No Known Problems Paternal Aunt    • No Known Problems Paternal Aunt    • Hypertension Family        Meds/Allergies       Current Outpatient Medications:   •  bisacodyl (DULCOLAX) 5 mg EC tablet  •  famotidine (PEPCID) 40 MG tablet  •  levonorgestrel (MIRENA) 20 MCG/24HR IUD  •  pantoprazole (PROTONIX) 40 mg tablet  •  polyethylene glycol (GLYCOLAX) 17 GM/SCOOP powder  •  vitamin B-12 (VITAMIN B-12) 500 mcg tablet  •  furosemide (LASIX) 40 mg tablet    Allergies   Allergen Reactions   • Bactrim [Sulfamethoxazole-Trimethoprim] Other (See Comments)     "Resulted in bowel obstruction"   • Ciprofloxacin Other (See Comments)     "Resulted in bladder problems"   • Clindamycin      Other reaction(s): CLINDAMYCIN  Other reaction(s): Other (See Comments)  Unknown Reaction from childhood   • Gentamicin      Other reaction(s): Other (See Comments)  Unknown Reaction from childhood  Other reaction(s): Other (See Comments)  Unknown Reaction from childhood   • Sulfa Antibiotics      Other reaction(s):  Other (See Comments)  BOWEL OBSTRUCTION           Objective     Blood pressure 130/80, temperature 97 8 °F (36 6 °C), temperature source Tympanic, height 5' 3" (1 6 m), weight 70 3 kg (155 lb), not currently breastfeeding  Body mass index is 27 46 kg/m²  PHYSICAL EXAM:      General Appearance:   Alert, cooperative, no distress   HEENT:   Normocephalic, atraumatic, anicteric      Neck:  Supple, symmetrical, trachea midline   Lungs:   Clear to auscultation bilaterally; no rales, rhonchi or wheezing; respirations unlabored    Heart[de-identified]   Regular rate and rhythm; no murmur, rub, or gallop  Abdomen:   Soft, non-tender, non-distended; normal bowel sounds; no masses, no organomegaly    Genitalia:   Deferred    Rectal:   Deferred    Extremities:  No cyanosis, clubbing or edema    Pulses:  2+ and symmetric    Skin:  No jaundice, rashes, or lesions    Lymph nodes:  No palpable cervical lymphadenopathy        Lab Results:   No visits with results within 1 Day(s) from this visit     Latest known visit with results is:   Transcribe Orders on 12/07/2021   Component Date Value   • WBC 12/07/2021 5 78    • RBC 12/07/2021 4 72    • Hemoglobin 12/07/2021 14 7    • Hematocrit 12/07/2021 46 2 (H)    • MCV 12/07/2021 98    • MCH 12/07/2021 31 1    • MCHC 12/07/2021 31 8    • RDW 12/07/2021 14 0    • MPV 12/07/2021 11 1    • Platelets 98/61/6522 293    • nRBC 12/07/2021 0    • Neutrophils Relative 12/07/2021 62    • Immat GRANS % 12/07/2021 0    • Lymphocytes Relative 12/07/2021 23    • Monocytes Relative 12/07/2021 8    • Eosinophils Relative 12/07/2021 6    • Basophils Relative 12/07/2021 1    • Neutrophils Absolute 12/07/2021 3 54    • Immature Grans Absolute 12/07/2021 0 02    • Lymphocytes Absolute 12/07/2021 1 35    • Monocytes Absolute 12/07/2021 0 46    • Eosinophils Absolute 12/07/2021 0 33    • Basophils Absolute 12/07/2021 0 08    • Sodium 12/07/2021 135 (L)    • Potassium 12/07/2021 4 4    • Chloride 12/07/2021 102    • CO2 12/07/2021 26    • ANION GAP 12/07/2021 7    • BUN 12/07/2021 16    • Creatinine 12/07/2021 0 82    • Glucose, Fasting 12/07/2021 101 (H)    • Calcium 12/07/2021 9 4    • AST 12/07/2021 26    • ALT 12/07/2021 40    • Alkaline Phosphatase 12/07/2021 30 (L)    • Total Protein 12/07/2021 7 7    • Albumin 12/07/2021 4 1    • Total Bilirubin 12/07/2021 0 57    • eGFR 12/07/2021 86    • Cholesterol 12/07/2021 220 (H)    • Triglycerides 12/07/2021 70    • HDL, Direct 12/07/2021 66    • LDL Calculated 12/07/2021 140 (H)    • Non-HDL-Chol (CHOL-HDL) 12/07/2021 154    • LDL Direct 12/07/2021 127 (H)    • Ferritin 12/07/2021 83    • TSH 3RD GENERATON 12/07/2021 1 560    • Free T4 12/07/2021 0 88    • Vit D, 25-Hydroxy 12/07/2021 31 2          Radiology Results:   No results found

## 2023-03-17 ENCOUNTER — TELEPHONE (OUTPATIENT)
Dept: GASTROENTEROLOGY | Facility: HOSPITAL | Age: 48
End: 2023-03-17

## 2023-03-23 ENCOUNTER — HOSPITAL ENCOUNTER (OUTPATIENT)
Dept: GASTROENTEROLOGY | Facility: HOSPITAL | Age: 48
End: 2023-03-23
Attending: INTERNAL MEDICINE

## 2023-03-23 VITALS
TEMPERATURE: 98.5 F | OXYGEN SATURATION: 94 % | RESPIRATION RATE: 18 BRPM | HEART RATE: 71 BPM | DIASTOLIC BLOOD PRESSURE: 90 MMHG | SYSTOLIC BLOOD PRESSURE: 143 MMHG

## 2023-03-23 DIAGNOSIS — K21.9 GASTROESOPHAGEAL REFLUX DISEASE WITHOUT ESOPHAGITIS: ICD-10-CM

## 2023-03-23 DIAGNOSIS — R07.89 OTHER CHEST PAIN: ICD-10-CM

## 2023-03-23 NOTE — PERIOPERATIVE NURSING NOTE
Patient brought in the room and explained the esophageal manometry procedure  After the confirmation of allergies, lidocaine 2% inserted via right /left nostril and  a transnasal insertion of the High Resolution esophageal manometry catheter was inserted via left nostril  Patient given water to drink during the insertion and once the catheter inserted pressure bands of both Upper esophageal sphincter  (UES) and Lower esophageal sphincter ( LES) were observed on the color contour  Patient instructed to take a deep breath to verify placement of the catheter, diaphragmatic pinch noted on inspiration  Catheter was secured to left cheek  Patient was assisted to supine position   Patient was instructed to relax  while acclimating the catheter for about 5 minutes  A 30 second baseline resting pressure was obtained to identify the UES and LES followed by a series of 10 liquid swallows using 5 cc room temperature normal saline to assess esophageal motility and bolus transit 1 multiple rapid drink swallow using 2 cc room temperature normal saline given a total of 5 drinks  Patient instructed to sit up at the edge of the stretcher and given 5 upright liquid swallows using 5 cc room temperature normal saline and 1 rapid drink challenge using 200 cc room temperature water  At the end of the procedure the high resolution esophageal manometry catheter was removed from the nostril intact  Dual sensor PH probe inserted via left nostril and secured  Zephr recorder teachback performed and patient verbalized understanding  Patient instructed to return next day to have probe remove  Discharge instructions given and patient ambulated out of room in stable condition

## 2023-04-05 ENCOUNTER — TRANSCRIBE ORDERS (OUTPATIENT)
Dept: GASTROENTEROLOGY | Facility: CLINIC | Age: 48
End: 2023-04-05

## 2023-04-05 ENCOUNTER — OFFICE VISIT (OUTPATIENT)
Dept: GASTROENTEROLOGY | Facility: CLINIC | Age: 48
End: 2023-04-05

## 2023-04-05 VITALS
SYSTOLIC BLOOD PRESSURE: 174 MMHG | DIASTOLIC BLOOD PRESSURE: 82 MMHG | HEIGHT: 63 IN | WEIGHT: 159 LBS | TEMPERATURE: 97.8 F | BODY MASS INDEX: 28.17 KG/M2

## 2023-04-05 DIAGNOSIS — R07.89 OTHER CHEST PAIN: ICD-10-CM

## 2023-04-05 DIAGNOSIS — K21.9 GASTROESOPHAGEAL REFLUX DISEASE WITHOUT ESOPHAGITIS: Primary | ICD-10-CM

## 2023-04-05 RX ORDER — MONTELUKAST SODIUM 10 MG/1
10 TABLET ORAL DAILY
COMMUNITY
Start: 2023-03-21

## 2023-04-05 RX ORDER — ALBUTEROL SULFATE 90 UG/1
AEROSOL, METERED RESPIRATORY (INHALATION)
COMMUNITY
Start: 2023-03-21

## 2023-04-05 NOTE — PROGRESS NOTES
[facility]  Progress Note - Brennan Brown 49 y o  female MRN: [id number]    Unit/Bed#:  Encounter: [id number]    Assessment/Plan:  1  Gastroesophageal reflux disease without esophagitis  - 24hr pH testing; Future  2  Other chest pain    We reviewed recent 24-hour pH study and manometry evaluation  Everything within the study was within normal limits except manometry did identify borderline IRP/LES pressures of 21 mm per mercury  We discussed that this may be something that is not clinically relevant since impedance is normal or may be secondary to a structural issue such as possible esophagitis or ring which we cannot do endoscopic evaluation for further evaluation  Colonoscopy is also planned at that time for screening for colorectal cancer  Symptoms of atypical chest pain may be related to acid reflux disease symptom correlation was not completely correlating on recent 24-hour pH study but there were a lot of variables as she was sick at the time  We will plan to repeat a 24-hour pH study now that she is better off PPI therapy for 5 days  We will try to see what the acid exposure time and acid burden is to see if she would benefit from an antireflux procedure in the future  It appears that she does have acid reflux is just unclear if this is pathological or not  Repeat 24-hour pH study and endoscopy will help with this  She has perceived benefit from PPI therapy with 25% improvement so we will continue the PPI therapy for now  We will have to further consider what to do in the long-term based on EGD and repeat 24-hour pH study  Chest pain may be also functional in origin a 24-hour pH study will help us evaluate this  If this is functional origin we may have to consider trial of amitriptyline  The good news is it does not appear that there is a esophageal motility issue at this time we can consider Endoflip in the future to reassess the IRP        Subjective:   She continues "to have chest pain but this is not very consistent with oral intake of food  Symptoms are not correlating with anything specific at this time  Symptoms are worse during the day and dissipate throughout the day  Answers for HPI/ROS submitted by the patient on 4/3/2023  Chronicity: chronic  Onset: more than 1 year ago  Onset quality: gradual  Frequency: daily  Episode duration: 8 Hours  Progression since onset: waxing and waning  Pain location: LUQ  Pain - numeric: 4/10  Pain quality: dull, a sensation of fullness  Radiates to: does not radiate  anorexia: No  arthralgias: No  belching: Yes  constipation: No  diarrhea: No  dysuria: No  fever: No  flatus: No  frequency: No  headaches: No  hematochezia: No  hematuria: No  melena: No  myalgias: No  nausea: Yes  weight loss: No  vomiting: No  Aggravated by: nothing  Relieved by: activity, belching, certain positions, movement        Objective:     Vitals: Blood pressure (!) 174/82, temperature 97 8 °F (36 6 °C), temperature source Tympanic, height 5' 3\" (1 6 m), weight 72 1 kg (159 lb), not currently breastfeeding  ,Body mass index is 28 17 kg/m²  [unfilled]    Physical Exam:    GEN: wn/wd, NAD  HEENT: MMM, no cervical or supraclavicular LAD, anciteric  CV: RRR, no m/r/g  CHEST: CTA b/l, no w/r/r  ABD: +BS, soft, NT/ND, no hepatosplenomegaly  EXT: no c/c/e  SKIN: no rashes  NEURO: aaox3      Invasive Devices     None                         Lab, Imaging and other studies:     No visits with results within 1 Day(s) from this visit     Latest known visit with results is:   Transcribe Orders on 12/07/2021   Component Date Value   • WBC 12/07/2021 5 78    • RBC 12/07/2021 4 72    • Hemoglobin 12/07/2021 14 7    • Hematocrit 12/07/2021 46 2 (H)    • MCV 12/07/2021 98    • MCH 12/07/2021 31 1    • MCHC 12/07/2021 31 8    • RDW 12/07/2021 14 0    • MPV 12/07/2021 11 1    • Platelets 71/84/7629 293    • nRBC 12/07/2021 0    • Neutrophils Relative 12/07/2021 62    • Immat " GRANS % 12/07/2021 0    • Lymphocytes Relative 12/07/2021 23    • Monocytes Relative 12/07/2021 8    • Eosinophils Relative 12/07/2021 6    • Basophils Relative 12/07/2021 1    • Neutrophils Absolute 12/07/2021 3 54    • Immature Grans Absolute 12/07/2021 0 02    • Lymphocytes Absolute 12/07/2021 1 35    • Monocytes Absolute 12/07/2021 0 46    • Eosinophils Absolute 12/07/2021 0 33    • Basophils Absolute 12/07/2021 0 08    • Sodium 12/07/2021 135 (L)    • Potassium 12/07/2021 4 4    • Chloride 12/07/2021 102    • CO2 12/07/2021 26    • ANION GAP 12/07/2021 7    • BUN 12/07/2021 16    • Creatinine 12/07/2021 0 82    • Glucose, Fasting 12/07/2021 101 (H)    • Calcium 12/07/2021 9 4    • AST 12/07/2021 26    • ALT 12/07/2021 40    • Alkaline Phosphatase 12/07/2021 30 (L)    • Total Protein 12/07/2021 7 7    • Albumin 12/07/2021 4 1    • Total Bilirubin 12/07/2021 0 57    • eGFR 12/07/2021 86    • Cholesterol 12/07/2021 220 (H)    • Triglycerides 12/07/2021 70    • HDL, Direct 12/07/2021 66    • LDL Calculated 12/07/2021 140 (H)    • Non-HDL-Chol (CHOL-HDL) 12/07/2021 154    • LDL Direct 12/07/2021 127 (H)    • Ferritin 12/07/2021 83    • TSH 3RD GENERATON 12/07/2021 1 560    • Free T4 12/07/2021 0 88    • Vit D, 25-Hydroxy 12/07/2021 31 2              No current facility-administered medications for this visit

## 2023-04-19 ENCOUNTER — LAB (OUTPATIENT)
Dept: LAB | Facility: CLINIC | Age: 48
End: 2023-04-19

## 2023-04-19 DIAGNOSIS — N95.1 PERIMENOPAUSAL SYMPTOMS: ICD-10-CM

## 2023-04-19 LAB
ESTRADIOL SERPL-MCNC: 47 PG/ML
FSH SERPL-ACNC: 13 MIU/ML

## 2023-04-25 ENCOUNTER — TELEPHONE (OUTPATIENT)
Dept: GASTROENTEROLOGY | Facility: HOSPITAL | Age: 48
End: 2023-04-25

## 2023-05-04 ENCOUNTER — HOSPITAL ENCOUNTER (OUTPATIENT)
Dept: GASTROENTEROLOGY | Facility: HOSPITAL | Age: 48
End: 2023-05-04
Attending: INTERNAL MEDICINE

## 2023-05-04 VITALS
TEMPERATURE: 98.4 F | SYSTOLIC BLOOD PRESSURE: 142 MMHG | OXYGEN SATURATION: 97 % | RESPIRATION RATE: 18 BRPM | HEART RATE: 91 BPM | DIASTOLIC BLOOD PRESSURE: 83 MMHG

## 2023-05-04 DIAGNOSIS — K21.9 GASTROESOPHAGEAL REFLUX DISEASE WITHOUT ESOPHAGITIS: ICD-10-CM

## 2023-05-04 NOTE — PERIOPERATIVE NURSING NOTE
Patient brought in room and educated on procedure  Lidocaine 2% topical solution inserted via left and right nostrils  PH 44 probe inserted via left nostril,positioned  and secured  Alana Gutierrez monitor teach back done and patient verbalized understanding  Mariaelena Russell instructions given to patient and instructed to return the next day to have the probe remove  Patient left in stable condition to return 5/5 for PH probe removal

## 2023-05-08 ENCOUNTER — NURSE TRIAGE (OUTPATIENT)
Dept: OTHER | Facility: OTHER | Age: 48
End: 2023-05-08

## 2023-05-08 DIAGNOSIS — Z12.11 ENCOUNTER FOR SCREENING COLONOSCOPY: Primary | ICD-10-CM

## 2023-05-08 RX ORDER — BISACODYL 5 MG/1
10 TABLET, DELAYED RELEASE ORAL ONCE
Qty: 2 TABLET | Refills: 0 | Status: SHIPPED | OUTPATIENT
Start: 2023-05-08 | End: 2023-05-10 | Stop reason: HOSPADM

## 2023-05-08 NOTE — TELEPHONE ENCOUNTER
"  Reason for Disposition  • Nursing judgment    Answer Assessment - Initial Assessment Questions  1  REASON FOR CALL or QUESTION: \"What is your reason for calling today? \" or \"How can I best help you? \" or \"What question do you have that I can help answer? \"      Patient called in stating she only received 2 (5mg dulcolax) tabs from pharmacy but instructions advised her to  Take a total of 4 tablets for prep  Patient also would like to know if she start prep earlier than 2pm tomorrow  Advised office will reach out once they speak to provider   Verbalized understanding      Protocols used: INFORMATION ONLY CALL - NO TRIAGE-ADULT-[place]    "

## 2023-05-08 NOTE — TELEPHONE ENCOUNTER
Regarding: Colonoscopy prep questions  ----- Message from Efren Benton sent at 5/8/2023  8:33 AM EDT -----  I am scheduled for a colonoscopy on Wednesday 5/10/23 and I have some prep questions

## 2023-05-09 ENCOUNTER — ANESTHESIA EVENT (OUTPATIENT)
Dept: ANESTHESIOLOGY | Facility: HOSPITAL | Age: 48
End: 2023-05-09

## 2023-05-09 ENCOUNTER — ANESTHESIA (OUTPATIENT)
Dept: ANESTHESIOLOGY | Facility: HOSPITAL | Age: 48
End: 2023-05-09

## 2023-05-09 NOTE — ANESTHESIA PREPROCEDURE EVALUATION
Procedure:  PRE-OP ONLY    Relevant Problems   ANESTHESIA (within normal limits)      CARDIO   (+) Essential hypertension   (+) Other chest pain      ENDO (within normal limits)      GI/HEPATIC   (+) Gastroesophageal reflux disease without esophagitis      /RENAL (within normal limits)      GYN (within normal limits)      HEMATOLOGY (within normal limits)      MUSCULOSKELETAL (within normal limits)      NEURO/PSYCH   (+) Situational anxiety      PULMONARY (within normal limits)             Anesthesia Plan  ASA Score- 2     Anesthesia Type- IV sedation with anesthesia with ASA Monitors  Additional Monitors:   Airway Plan:           Plan Factors-Exercise tolerance (METS): >4 METS  Chart reviewed  Existing labs reviewed  Patient summary reviewed  Patient is not a current smoker  Induction- intravenous  Postoperative Plan-     Informed Consent- Anesthetic plan and risks discussed with patient

## 2023-05-10 ENCOUNTER — ANESTHESIA (OUTPATIENT)
Dept: GASTROENTEROLOGY | Facility: MEDICAL CENTER | Age: 48
End: 2023-05-10

## 2023-05-10 ENCOUNTER — ANESTHESIA EVENT (OUTPATIENT)
Dept: GASTROENTEROLOGY | Facility: MEDICAL CENTER | Age: 48
End: 2023-05-10

## 2023-05-10 ENCOUNTER — HOSPITAL ENCOUNTER (OUTPATIENT)
Dept: GASTROENTEROLOGY | Facility: MEDICAL CENTER | Age: 48
Setting detail: OUTPATIENT SURGERY
Discharge: HOME | End: 2023-05-10
Attending: INTERNAL MEDICINE | Admitting: INTERNAL MEDICINE

## 2023-05-10 VITALS
DIASTOLIC BLOOD PRESSURE: 64 MMHG | OXYGEN SATURATION: 99 % | TEMPERATURE: 97.3 F | RESPIRATION RATE: 18 BRPM | SYSTOLIC BLOOD PRESSURE: 135 MMHG | HEART RATE: 79 BPM

## 2023-05-10 DIAGNOSIS — R07.89 OTHER CHEST PAIN: ICD-10-CM

## 2023-05-10 DIAGNOSIS — Z12.11 SCREENING FOR COLON CANCER: ICD-10-CM

## 2023-05-10 DIAGNOSIS — K21.9 GASTROESOPHAGEAL REFLUX DISEASE WITHOUT ESOPHAGITIS: ICD-10-CM

## 2023-05-10 LAB
EXT PREGNANCY TEST URINE: NEGATIVE
EXT. CONTROL: NORMAL

## 2023-05-10 RX ORDER — PROPOFOL 10 MG/ML
INJECTION, EMULSION INTRAVENOUS AS NEEDED
Status: DISCONTINUED | OUTPATIENT
Start: 2023-05-10 | End: 2023-05-10

## 2023-05-10 RX ORDER — SODIUM CHLORIDE 9 MG/ML
125 INJECTION, SOLUTION INTRAVENOUS CONTINUOUS
Status: DISCONTINUED | OUTPATIENT
Start: 2023-05-10 | End: 2023-05-14 | Stop reason: HOSPADM

## 2023-05-10 RX ORDER — LIDOCAINE HYDROCHLORIDE 20 MG/ML
INJECTION, SOLUTION EPIDURAL; INFILTRATION; INTRACAUDAL; PERINEURAL AS NEEDED
Status: DISCONTINUED | OUTPATIENT
Start: 2023-05-10 | End: 2023-05-10

## 2023-05-10 RX ADMIN — LIDOCAINE HYDROCHLORIDE 100 MG: 20 INJECTION, SOLUTION EPIDURAL; INFILTRATION; INTRACAUDAL; PERINEURAL at 14:37

## 2023-05-10 RX ADMIN — SODIUM CHLORIDE 125 ML/HR: 0.9 INJECTION, SOLUTION INTRAVENOUS at 13:58

## 2023-05-10 RX ADMIN — PROPOFOL 50 MG: 10 INJECTION, EMULSION INTRAVENOUS at 14:51

## 2023-05-10 RX ADMIN — PROPOFOL 50 MG: 10 INJECTION, EMULSION INTRAVENOUS at 14:57

## 2023-05-10 RX ADMIN — PROPOFOL 50 MG: 10 INJECTION, EMULSION INTRAVENOUS at 14:45

## 2023-05-10 RX ADMIN — PROPOFOL 100 MG: 10 INJECTION, EMULSION INTRAVENOUS at 14:37

## 2023-05-10 RX ADMIN — PROPOFOL 50 MG: 10 INJECTION, EMULSION INTRAVENOUS at 14:39

## 2023-05-10 RX ADMIN — PROPOFOL 50 MG: 10 INJECTION, EMULSION INTRAVENOUS at 14:42

## 2023-05-10 NOTE — H&P
History and Physical - [facility]  Haylie Kerr 49 y o  female MRN: [id number]                  HPI: Haylie Kerr is a 49y o  year old female who presents for EGD and colonoscopy evaluation  EGD is for atypical chest pain and GERD  Colonoscopy is for screen for colorectal cancer evaluation  REVIEW OF SYSTEMS: Per the HPI, and otherwise unremarkable  Historical Information   Past Medical History:   Diagnosis Date   • Atrial flutter [facility])    • Fibroid    • Herniated lumbar intervertebral disc    • Hypertension    • Intestinal obstruction (HCC)    • Scoliosis    • Urinary tract infection    • Varicella      Past Surgical History:   Procedure Laterality Date   • APPENDECTOMY     • BACK SURGERY  2015    Lower back  L5-S1 microdiscectomy    • BREAST BIOPSY  2019   • BREAST CYST ASPIRATION Right 2019   • CYST REMOVAL  2007    Excision of vaginal cyst or tumor  Yazmin Hassan     • INSERTION OF INTRAUTERINE DEVICE (IUD)  2021    Mirena   • OTHER SURGICAL HISTORY      Surgical lysis of intestinal adhesions   • US BREAST CYST ASPIRATION RIGHT INITIAL Right 2019   • VOLVULUS REDUCTION     • WISDOM TOOTH EXTRACTION       Social History   Social History     Substance and Sexual Activity   Alcohol Use Yes   • Alcohol/week: 8 0 standard drinks   • Types: 2 Glasses of wine, 6 Standard drinks or equivalent per week    Comment: Weekends only     Social History     Substance and Sexual Activity   Drug Use No     Social History     Tobacco Use   Smoking Status Former   • Packs/day: 0 00   • Years: 0 00   • Pack years: 0 00   • Types: Cigarettes   Smokeless Tobacco Never     Family History   Problem Relation Age of Onset   • Breast cancer Mother    • BRCA1 Negative Mother    • BRCA2 Negative Mother    • Heart disease Mother    • Hypertension Mother          from breast cancer & arrthymia   • Hypertension Father          from chronic leukemia & congestive heart failure "  • Leukemia Father 71   • Cancer Father    • Heart disease Father          from Chronic Leukemia & Congestive Heart Failure   • No Known Problems Daughter    • No Known Problems Daughter    • Stroke Maternal Grandmother    • No Known Problems Maternal Grandfather    • No Known Problems Paternal Grandmother    • No Known Problems Paternal Grandfather    • No Known Problems Paternal Aunt    • No Known Problems Paternal Aunt    • Hypertension Family        Meds/Allergies       Current Outpatient Medications:   •  albuterol (PROVENTIL HFA,VENTOLIN HFA) 90 mcg/act inhaler  •  bisacodyl (DULCOLAX) 5 mg EC tablet  •  famotidine (PEPCID) 40 MG tablet  •  furosemide (LASIX) 40 mg tablet  •  levonorgestrel (MIRENA) 20 MCG/24HR IUD  •  montelukast (SINGULAIR) 10 mg tablet  •  pantoprazole (PROTONIX) 40 mg tablet  •  polyethylene glycol (GLYCOLAX) 17 GM/SCOOP powder  •  vitamin B-12 (VITAMIN B-12) 500 mcg tablet    Allergies   Allergen Reactions   • Bactrim [Sulfamethoxazole-Trimethoprim] Other (See Comments)     \"Resulted in bowel obstruction\"   • Ciprofloxacin Other (See Comments)     \"Resulted in bladder problems\"   • Clindamycin      Other reaction(s): CLINDAMYCIN  Other reaction(s): Other (See Comments)  Unknown Reaction from childhood   • Gentamicin      Other reaction(s): Other (See Comments)  Unknown Reaction from childhood  Other reaction(s): Other (See Comments)  Unknown Reaction from childhood   • Sulfa Antibiotics      Other reaction(s): Other (See Comments)  BOWEL OBSTRUCTION       Objective     LMP 2023 (Exact Date)       PHYSICAL EXAM    Gen: NAD  Head: NCAT  CV: RRR  CHEST: Clear  ABD: soft, NT/ND  EXT: no edema      ASSESSMENT/PLAN:  This is a 49y o  year old female here for EGD and colonoscopy, and she is stable and optimized for her procedure          "

## 2023-05-10 NOTE — ANESTHESIA PREPROCEDURE EVALUATION
Procedure:  COLONOSCOPY  EGD    Relevant Problems   ANESTHESIA (within normal limits)      CARDIO   (+) Essential hypertension   (+) Other chest pain      ENDO (within normal limits)      GI/HEPATIC   (+) Gastroesophageal reflux disease without esophagitis      /RENAL (within normal limits)      GYN (within normal limits)      HEMATOLOGY (within normal limits)      MUSCULOSKELETAL (within normal limits)      NEURO/PSYCH   (+) Situational anxiety      PULMONARY (within normal limits)        Physical Exam    Airway    Mallampati score: II  TM Distance: >3 FB  Neck ROM: full     Dental   No notable dental hx     Cardiovascular  Rhythm: regular, Rate: normal, Cardiovascular exam normal    Pulmonary  Pulmonary exam normal Breath sounds clear to auscultation,     Other Findings        Anesthesia Plan  ASA Score- 2     Anesthesia Type- IV sedation with anesthesia with ASA Monitors  Additional Monitors:   Airway Plan:           Plan Factors-Exercise tolerance (METS): >4 METS  Chart reviewed  Existing labs reviewed  Patient summary reviewed  Patient is not a current smoker  Induction- intravenous  Postoperative Plan-     Informed Consent- Anesthetic plan and risks discussed with patient

## 2023-05-10 NOTE — ANESTHESIA POSTPROCEDURE EVALUATION
Post-Op Assessment Note    CV Status:  Stable    Pain management: adequate     Mental Status:  Alert and awake   Hydration Status:  Euvolemic   PONV Controlled:  Controlled   Airway Patency:  Patent      Post Op Vitals Reviewed: Yes      Staff: Anesthesiologist         No notable events documented      BP      Temp     Pulse     Resp      SpO2      /64   Pulse 79   Temp (!) 97 3 °F (36 3 °C) (Temporal)   Resp 18   LMP 04/19/2023 (Exact Date) Comment: uhcg negative  SpO2 99%

## 2023-11-10 LAB
ALBUMIN SERPL BCP-MCNC: 4.3 G/DL (ref 3.5–5)
ALP SERPL-CCNC: 20 U/L (ref 34–104)
ALT SERPL W P-5'-P-CCNC: 18 U/L (ref 7–52)
ANION GAP SERPL CALCULATED.3IONS-SCNC: 7 MMOL/L
AST SERPL W P-5'-P-CCNC: 25 U/L (ref 13–39)
BILIRUB SERPL-MCNC: 0.55 MG/DL (ref 0.2–1)
BUN SERPL-MCNC: 13 MG/DL (ref 5–25)
CALCIUM SERPL-MCNC: 9.2 MG/DL (ref 8.4–10.2)
CHLORIDE SERPL-SCNC: 101 MMOL/L (ref 96–108)
CO2 SERPL-SCNC: 28 MMOL/L (ref 21–32)
CREAT SERPL-MCNC: 0.68 MG/DL (ref 0.6–1.3)
GFR SERPL CREATININE-BSD FRML MDRD: 103 ML/MIN/1.73SQ M
GLUCOSE P FAST SERPL-MCNC: 93 MG/DL (ref 65–99)
POTASSIUM SERPL-SCNC: 4.4 MMOL/L (ref 3.5–5.3)
PROT SERPL-MCNC: 6.6 G/DL (ref 6.4–8.4)
SODIUM SERPL-SCNC: 136 MMOL/L (ref 135–147)

## 2024-09-17 NOTE — PROGRESS NOTES
Called and advised patient of below and patient verbalized understanding. Script sent out for patient. 90 day script sent as patient noted this is only until January.    Daily Note     Today's date: 2022  Patient name: Eligio Gould  : 1975  MRN: [id number]  Referring provider: Candelaria Caballero DO  Dx:   Encounter Diagnosis     ICD-10-CM    1  Cervical segment dysfunction  M99 01    2  Thoracic segment dysfunction  M99 02    3  Hip pain, left  M25 552    4  Cervicalgia  M54 2    5  Sacroiliac dysfunction  M53 3                   Subjective:  Pt reports feeling better  Some soreness on Tuesday after RTW on Monday w/ prolonged sitting  She does c/o mild headache today  Objective: See treatment diary below      Assessment: Tolerated treatment well  Patient demonstrated fatigue post treatment, exhibited good technique with therapeutic exercises and would benefit from continued PT   Restrictions do continue to decrease  Most tightness over piriformis and R 1st rib  Plan: Continue per plan of care        Precautions: none      Manuals 4/13 4/18 4/22 4/25 4/29 5/3 5/5 5/9 5/11 5/19   Scissor MET L ant torsion FELIX L post torsion np          Graston L glute med, piriformis, QL 5 min 10 min 15 min 15 min 15 min 15 min 15 min 15 min 15 min 15 min   TPR B sub-occipitals 5 min 10 min w/ 1st rib mobs 15 min w/ 1st rib 10 min 10 min 10 min 10 min 10 min 10 min 10 min   Manual sidelying quad and QL stretch    5 min 5 min 5 min 5 min 5 min 5 min 5 min   Neuro Re-Ed 4/13 4/18 4/22 4/25 4/29 5/3 5/5 5/9 5/11 5/19   ppt 5"x20 hep           bridges 5"x20 5"x20 5"x20 5"x20 5"x20 5"x20 5"x20 5"x20 5"x20 5"x 20   clamshells 5"x20 5"x20 5"x20 5"x20 5"x20 5"x20 5"x20 5"x20 5"x20 5" x20   Prone hip ext nv x20 x20 x20 x20 x20 x20 x20 x20 x20                                                                                 TherEx 4/13 4/18 4/20 4/25 4/29 5/3 5/5 5/9 5/11 5/19   B UT stretch 30"x3 hep 30"x3 30"x3 30"x3 30"x3 hep      B levator stretch 30"x3 hep 30"x3 30"x3 30"x3 30"x3 hep      Piriformis stretch nv 30"x3 30"x3 30"x3 30"x3 30"x3 30"x3 30"x3 30"x3 30"x3   Hamstring stretch nv 30"x3 30"x3 30"x3 30"x3 30"x3 30"x3 30"x3 30"x3 30"x3   Kneeling hip flexor and QL stretch    30"x3 30"x3        Ther Activity                                       Gait Training                                       Modalities